# Patient Record
Sex: MALE | Race: WHITE | URBAN - METROPOLITAN AREA
[De-identification: names, ages, dates, MRNs, and addresses within clinical notes are randomized per-mention and may not be internally consistent; named-entity substitution may affect disease eponyms.]

---

## 2018-05-24 ENCOUNTER — HOSPITAL ENCOUNTER (OUTPATIENT)
Age: 78
Discharge: HOME | End: 2018-05-26
Payer: COMMERCIAL

## 2018-05-24 LAB
ALBUMIN SERPL-MCNC: 3.7 G/DL (ref 3.5–5.2)
ALP BLD-CCNC: 77 U/L (ref 40–129)
ALT SERPL-CCNC: 14 U/L (ref 0–40)
ANION GAP SERPL CALCULATED.3IONS-SCNC: 15 MMOL/L (ref 7–16)
AST SERPL-CCNC: 21 U/L (ref 0–39)
BILIRUB SERPL-MCNC: 0.4 MG/DL (ref 0–1.2)
BUN BLDV-MCNC: 19 MG/DL (ref 8–23)
CALCIUM SERPL-MCNC: 9.5 MG/DL (ref 8.6–10.2)
CHLORIDE BLD-SCNC: 99 MMOL/L (ref 98–107)
CO2: 25 MMOL/L (ref 22–29)
CREAT SERPL-MCNC: 0.8 MG/DL (ref 0.7–1.2)
GFR AFRICAN AMERICAN: >60
GFR NON-AFRICAN AMERICAN: >60 ML/MIN/1.73
GLUCOSE BLD-MCNC: 102 MG/DL (ref 74–109)
HCT VFR BLD CALC: 45.6 % (ref 37–54)
HEMOGLOBIN: 14.2 G/DL (ref 12.5–16.5)
MCH RBC QN AUTO: 29.8 PG (ref 26–35)
MCHC RBC AUTO-ENTMCNC: 31.1 % (ref 32–34.5)
MCV RBC AUTO: 95.8 FL (ref 80–99.9)
PDW BLD-RTO: 14.8 FL (ref 11.5–15)
PLATELET # BLD: 269 E9/L (ref 130–450)
PMV BLD AUTO: 9.3 FL (ref 7–12)
POTASSIUM SERPL-SCNC: 4.5 MMOL/L (ref 3.5–5)
PRO-BNP: 56 PG/ML (ref 0–450)
PROSTATE SPECIFIC ANTIGEN: 1.69 NG/ML (ref 0–4)
RBC # BLD: 4.76 E12/L (ref 3.8–5.8)
SODIUM BLD-SCNC: 139 MMOL/L (ref 132–146)
TOTAL PROTEIN: 7.1 G/DL (ref 6.4–8.3)
WBC # BLD: 6.3 E9/L (ref 4.5–11.5)

## 2018-05-24 PROCEDURE — 83880 ASSAY OF NATRIURETIC PEPTIDE: CPT

## 2018-05-24 PROCEDURE — 80053 COMPREHEN METABOLIC PANEL: CPT

## 2018-05-24 PROCEDURE — 83036 HEMOGLOBIN GLYCOSYLATED A1C: CPT

## 2018-05-24 PROCEDURE — G0103 PSA SCREENING: HCPCS

## 2018-05-24 PROCEDURE — 85027 COMPLETE CBC AUTOMATED: CPT

## 2018-05-25 LAB — HBA1C MFR BLD: 6 % (ref 4.8–5.9)

## 2019-02-28 ENCOUNTER — HOSPITAL ENCOUNTER (OUTPATIENT)
Age: 79
Discharge: HOME | End: 2019-03-02
Payer: COMMERCIAL

## 2019-02-28 LAB
ALBUMIN SERPL-MCNC: 3.7 G/DL (ref 3.5–5.2)
ALP BLD-CCNC: 90 U/L (ref 40–129)
ALT SERPL-CCNC: 13 U/L (ref 0–40)
ANION GAP SERPL CALCULATED.3IONS-SCNC: 11 MMOL/L (ref 7–16)
AST SERPL-CCNC: 18 U/L (ref 0–39)
BILIRUB SERPL-MCNC: 0.3 MG/DL (ref 0–1.2)
BUN BLDV-MCNC: 23 MG/DL (ref 8–23)
CALCIUM SERPL-MCNC: 9.6 MG/DL (ref 8.6–10.2)
CHLORIDE BLD-SCNC: 101 MMOL/L (ref 98–107)
CHOLESTEROL, TOTAL: 180 MG/DL (ref 0–199)
CO2: 29 MMOL/L (ref 22–29)
CREAT SERPL-MCNC: 0.9 MG/DL (ref 0.7–1.2)
GFR AFRICAN AMERICAN: >60
GFR NON-AFRICAN AMERICAN: >60 ML/MIN/1.73
GLUCOSE BLD-MCNC: 114 MG/DL (ref 74–99)
HCT VFR BLD CALC: 44.9 % (ref 37–54)
HDLC SERPL-MCNC: 53 MG/DL
HEMOGLOBIN: 14.1 G/DL (ref 12.5–16.5)
LDL CHOLESTEROL CALCULATED: 114 MG/DL (ref 0–99)
MCH RBC QN AUTO: 29.7 PG (ref 26–35)
MCHC RBC AUTO-ENTMCNC: 31.4 % (ref 32–34.5)
MCV RBC AUTO: 94.7 FL (ref 80–99.9)
PDW BLD-RTO: 14.7 FL (ref 11.5–15)
PLATELET # BLD: 298 E9/L (ref 130–450)
PMV BLD AUTO: 9.2 FL (ref 7–12)
POTASSIUM SERPL-SCNC: 4.9 MMOL/L (ref 3.5–5)
PROSTATE SPECIFIC ANTIGEN: 1.35 NG/ML (ref 0–4)
RBC # BLD: 4.74 E12/L (ref 3.8–5.8)
SODIUM BLD-SCNC: 141 MMOL/L (ref 132–146)
TOTAL PROTEIN: 7.3 G/DL (ref 6.4–8.3)
TRIGL SERPL-MCNC: 65 MG/DL (ref 0–149)
TSH SERPL DL<=0.05 MIU/L-ACNC: 2.53 UIU/ML (ref 0.27–4.2)
VITAMIN D 25-HYDROXY: 64 NG/ML (ref 30–100)
VLDLC SERPL CALC-MCNC: 13 MG/DL
WBC # BLD: 6.7 E9/L (ref 4.5–11.5)

## 2019-02-28 PROCEDURE — 80061 LIPID PANEL: CPT

## 2019-02-28 PROCEDURE — 85027 COMPLETE CBC AUTOMATED: CPT

## 2019-02-28 PROCEDURE — 80053 COMPREHEN METABOLIC PANEL: CPT

## 2019-02-28 PROCEDURE — 82306 VITAMIN D 25 HYDROXY: CPT

## 2019-02-28 PROCEDURE — G0103 PSA SCREENING: HCPCS

## 2019-02-28 PROCEDURE — 84443 ASSAY THYROID STIM HORMONE: CPT

## 2020-04-16 ENCOUNTER — HOSPITAL ENCOUNTER (OUTPATIENT)
Age: 80
Discharge: HOME | End: 2020-04-18
Payer: MEDICARE

## 2020-04-16 LAB
ALBUMIN SERPL-MCNC: 3.6 G/DL (ref 3.5–5.2)
ALP BLD-CCNC: 98 U/L (ref 40–129)
ALT SERPL-CCNC: 12 U/L (ref 0–40)
ANION GAP SERPL CALCULATED.3IONS-SCNC: 14 MMOL/L (ref 7–16)
AST SERPL-CCNC: 15 U/L (ref 0–39)
BILIRUB SERPL-MCNC: 0.3 MG/DL (ref 0–1.2)
BUN BLDV-MCNC: 17 MG/DL (ref 8–23)
C-REACTIVE PROTEIN: 1.5 MG/DL (ref 0–0.4)
CALCIUM SERPL-MCNC: 9.8 MG/DL (ref 8.6–10.2)
CHLORIDE BLD-SCNC: 101 MMOL/L (ref 98–107)
CO2: 26 MMOL/L (ref 22–29)
CREAT SERPL-MCNC: 0.9 MG/DL (ref 0.7–1.2)
GFR AFRICAN AMERICAN: >60
GFR NON-AFRICAN AMERICAN: >60 ML/MIN/1.73
GLUCOSE BLD-MCNC: 110 MG/DL (ref 74–99)
HCT VFR BLD CALC: 42 % (ref 37–54)
HEMOGLOBIN: 12.7 G/DL (ref 12.5–16.5)
MCH RBC QN AUTO: 26.7 PG (ref 26–35)
MCHC RBC AUTO-ENTMCNC: 30.2 % (ref 32–34.5)
MCV RBC AUTO: 88.4 FL (ref 80–99.9)
PDW BLD-RTO: 16 FL (ref 11.5–15)
PLATELET # BLD: 291 E9/L (ref 130–450)
PMV BLD AUTO: 9.2 FL (ref 7–12)
POTASSIUM SERPL-SCNC: 4.4 MMOL/L (ref 3.5–5)
PROSTATE SPECIFIC ANTIGEN: 2.68 NG/ML (ref 0–4)
RBC # BLD: 4.75 E12/L (ref 3.8–5.8)
SEDIMENTATION RATE, ERYTHROCYTE: 69 MM/HR (ref 0–15)
SODIUM BLD-SCNC: 141 MMOL/L (ref 132–146)
TOTAL PROTEIN: 7.4 G/DL (ref 6.4–8.3)
WBC # BLD: 6.3 E9/L (ref 4.5–11.5)

## 2020-04-16 PROCEDURE — 85027 COMPLETE CBC AUTOMATED: CPT

## 2020-04-16 PROCEDURE — 86140 C-REACTIVE PROTEIN: CPT

## 2020-04-16 PROCEDURE — G0103 PSA SCREENING: HCPCS

## 2020-04-16 PROCEDURE — 80053 COMPREHEN METABOLIC PANEL: CPT

## 2020-04-16 PROCEDURE — 85651 RBC SED RATE NONAUTOMATED: CPT

## 2023-01-01 ENCOUNTER — APPOINTMENT (OUTPATIENT)
Dept: GENERAL RADIOLOGY | Age: 83
DRG: 291 | End: 2023-01-01
Payer: MEDICARE

## 2023-01-01 ENCOUNTER — HOSPITAL ENCOUNTER (INPATIENT)
Age: 83
LOS: 9 days | DRG: 291 | End: 2023-07-19
Attending: EMERGENCY MEDICINE | Admitting: STUDENT IN AN ORGANIZED HEALTH CARE EDUCATION/TRAINING PROGRAM
Payer: MEDICARE

## 2023-01-01 ENCOUNTER — APPOINTMENT (OUTPATIENT)
Dept: CT IMAGING | Age: 83
DRG: 291 | End: 2023-01-01
Payer: MEDICARE

## 2023-01-01 VITALS
TEMPERATURE: 98.7 F | HEART RATE: 80 BPM | WEIGHT: 315 LBS | BODY MASS INDEX: 49.44 KG/M2 | RESPIRATION RATE: 18 BRPM | SYSTOLIC BLOOD PRESSURE: 117 MMHG | OXYGEN SATURATION: 94 % | DIASTOLIC BLOOD PRESSURE: 65 MMHG | HEIGHT: 67 IN

## 2023-01-01 DIAGNOSIS — L03.119 CELLULITIS OF LOWER EXTREMITY, UNSPECIFIED LATERALITY: ICD-10-CM

## 2023-01-01 DIAGNOSIS — R41.82 ALTERED MENTAL STATUS, UNSPECIFIED ALTERED MENTAL STATUS TYPE: Primary | ICD-10-CM

## 2023-01-01 DIAGNOSIS — I48.91 NEW ONSET ATRIAL FIBRILLATION (HCC): ICD-10-CM

## 2023-01-01 LAB
ALBUMIN SERPL-MCNC: 2.5 G/DL (ref 3.5–5.2)
ALBUMIN SERPL-MCNC: 2.7 G/DL (ref 3.5–5.2)
ALBUMIN SERPL-MCNC: 2.8 G/DL (ref 3.5–5.2)
ALBUMIN SERPL-MCNC: 3 G/DL (ref 3.5–5.2)
ALBUMIN SERPL-MCNC: 3.4 G/DL (ref 3.5–5.2)
ALP SERPL-CCNC: 74 U/L (ref 40–129)
ALP SERPL-CCNC: 76 U/L (ref 40–129)
ALP SERPL-CCNC: 78 U/L (ref 40–129)
ALP SERPL-CCNC: 85 U/L (ref 40–129)
ALP SERPL-CCNC: 99 U/L (ref 40–129)
ALT SERPL-CCNC: 10 U/L (ref 0–40)
ALT SERPL-CCNC: 5 U/L (ref 0–40)
ALT SERPL-CCNC: <5 U/L (ref 0–40)
ANION GAP SERPL CALCULATED.3IONS-SCNC: 3 MMOL/L (ref 7–16)
ANION GAP SERPL CALCULATED.3IONS-SCNC: 5 MMOL/L (ref 7–16)
ANION GAP SERPL CALCULATED.3IONS-SCNC: 6 MMOL/L (ref 7–16)
ANION GAP SERPL CALCULATED.3IONS-SCNC: 6 MMOL/L (ref 7–16)
ANION GAP SERPL CALCULATED.3IONS-SCNC: 7 MMOL/L (ref 7–16)
ANION GAP SERPL CALCULATED.3IONS-SCNC: 9 MMOL/L (ref 7–16)
ANISOCYTOSIS: ABNORMAL
AST SERPL-CCNC: 13 U/L (ref 0–39)
AST SERPL-CCNC: 14 U/L (ref 0–39)
AST SERPL-CCNC: 17 U/L (ref 0–39)
AST SERPL-CCNC: 17 U/L (ref 0–39)
AST SERPL-CCNC: 19 U/L (ref 0–39)
ATYPICAL LYMPHOCYTE ABSOLUTE COUNT: 0.1 K/UL (ref 0–0.46)
ATYPICAL LYMPHOCYTES: 2 % (ref 0–4)
B.E.: 11.7 MMOL/L (ref -3–3)
B.E.: 15.7 MMOL/L (ref -3–3)
B.E.: 16.6 MMOL/L (ref -3–3)
BACTERIA URNS QL MICRO: ABNORMAL /HPF
BASOPHILS # BLD: 0 K/UL (ref 0–0.2)
BASOPHILS # BLD: 0.01 K/UL (ref 0–0.2)
BASOPHILS # BLD: 0.01 K/UL (ref 0–0.2)
BASOPHILS # BLD: 0.02 K/UL (ref 0–0.2)
BASOPHILS # BLD: 0.03 E9/L (ref 0–0.2)
BASOPHILS NFR BLD: 0 % (ref 0–2)
BASOPHILS NFR BLD: 0.5 % (ref 0–2)
BILIRUB SERPL-MCNC: 0.5 MG/DL (ref 0–1.2)
BILIRUB SERPL-MCNC: 0.6 MG/DL (ref 0–1.2)
BILIRUB SERPL-MCNC: 0.7 MG/DL (ref 0–1.2)
BILIRUB UR QL STRIP: NEGATIVE
BNP BLD-MCNC: 1025 PG/ML (ref 0–450)
BNP BLD-MCNC: 2027 PG/ML (ref 0–450)
BUN SERPL-MCNC: 18 MG/DL (ref 6–23)
BUN SERPL-MCNC: 21 MG/DL (ref 6–23)
BUN SERPL-MCNC: 21 MG/DL (ref 6–23)
BUN SERPL-MCNC: 22 MG/DL (ref 6–23)
BUN SERPL-MCNC: 22 MG/DL (ref 6–23)
BUN SERPL-MCNC: 23 MG/DL (ref 6–23)
BUN SERPL-MCNC: 23 MG/DL (ref 6–23)
BUN SERPL-MCNC: 24 MG/DL (ref 6–23)
BUN SERPL-MCNC: 27 MG/DL (ref 6–23)
CALCIUM SERPL-MCNC: 8.7 MG/DL (ref 8.6–10.2)
CALCIUM SERPL-MCNC: 8.9 MG/DL (ref 8.6–10.2)
CALCIUM SERPL-MCNC: 9 MG/DL (ref 8.6–10.2)
CALCIUM SERPL-MCNC: 9.1 MG/DL (ref 8.6–10.2)
CALCIUM SERPL-MCNC: 9.1 MG/DL (ref 8.6–10.2)
CALCIUM SERPL-MCNC: 9.2 MG/DL (ref 8.6–10.2)
CALCIUM SERPL-MCNC: 9.3 MG/DL (ref 8.6–10.2)
CHLORIDE SERPL-SCNC: 91 MMOL/L (ref 98–107)
CHLORIDE SERPL-SCNC: 94 MMOL/L (ref 98–107)
CHLORIDE SERPL-SCNC: 95 MMOL/L (ref 98–107)
CHLORIDE SERPL-SCNC: 96 MMOL/L (ref 98–107)
CHLORIDE SERPL-SCNC: 97 MMOL/L (ref 98–107)
CHLORIDE SERPL-SCNC: 98 MMOL/L (ref 98–107)
CHLORIDE SERPL-SCNC: 99 MMOL/L (ref 98–107)
CHP ED QC CHECK: NORMAL
CLARITY UR: CLEAR
CO2 SERPL-SCNC: 32 MMOL/L (ref 22–29)
CO2 SERPL-SCNC: 33 MMOL/L (ref 22–29)
CO2 SERPL-SCNC: 35 MMOL/L (ref 22–29)
CO2 SERPL-SCNC: 38 MMOL/L (ref 22–29)
CO2 SERPL-SCNC: 39 MMOL/L (ref 22–29)
CO2 SERPL-SCNC: 39 MMOL/L (ref 22–29)
CO2 SERPL-SCNC: 40 MMOL/L (ref 22–29)
CO2 SERPL-SCNC: 44 MMOL/L (ref 22–29)
CO2 SERPL-SCNC: 45 MMOL/L (ref 22–29)
COHB: 1.9 % (ref 0–1.5)
COHB: 2.1 % (ref 0–1.5)
COHB: 2.2 % (ref 0–1.5)
COLOR UR: YELLOW
COMMENT: ABNORMAL
COMMENT: ABNORMAL
CREAT SERPL-MCNC: 0.7 MG/DL (ref 0.7–1.2)
CREAT SERPL-MCNC: 0.7 MG/DL (ref 0.7–1.2)
CREAT SERPL-MCNC: 0.8 MG/DL (ref 0.7–1.2)
CREAT SERPL-MCNC: 0.9 MG/DL (ref 0.7–1.2)
CREAT SERPL-MCNC: 0.9 MG/DL (ref 0.7–1.2)
CRITICAL: ABNORMAL
DATE ANALYZED: ABNORMAL
DATE OF COLLECTION: ABNORMAL
EKG ATRIAL RATE: 88 BPM
EKG Q-T INTERVAL: 414 MS
EKG QRS DURATION: 142 MS
EKG QTC CALCULATION (BAZETT): 468 MS
EKG R AXIS: 121 DEGREES
EKG T AXIS: -12 DEGREES
EKG VENTRICULAR RATE: 77 BPM
EOSINOPHIL # BLD: 0.06 K/UL (ref 0.05–0.5)
EOSINOPHIL # BLD: 0.09 E9/L (ref 0.05–0.5)
EOSINOPHIL # BLD: 0.1 K/UL (ref 0.05–0.5)
EOSINOPHIL # BLD: 0.11 K/UL (ref 0.05–0.5)
EOSINOPHIL # BLD: 0.2 K/UL (ref 0.05–0.5)
EOSINOPHIL NFR BLD: 1.6 % (ref 0–6)
EOSINOPHILS RELATIVE PERCENT: 1 % (ref 0–6)
EOSINOPHILS RELATIVE PERCENT: 2 % (ref 0–6)
EOSINOPHILS RELATIVE PERCENT: 3 % (ref 0–6)
EOSINOPHILS RELATIVE PERCENT: 4 % (ref 0–6)
ERYTHROCYTE [DISTWIDTH] IN BLOOD BY AUTOMATED COUNT: 18.2 FL (ref 11.5–15)
ERYTHROCYTE [DISTWIDTH] IN BLOOD BY AUTOMATED COUNT: 18.2 FL (ref 11.5–15)
ERYTHROCYTE [DISTWIDTH] IN BLOOD BY AUTOMATED COUNT: 18.4 FL (ref 11.5–15)
ERYTHROCYTE [DISTWIDTH] IN BLOOD BY AUTOMATED COUNT: 18.4 FL (ref 11.5–15)
ERYTHROCYTE [DISTWIDTH] IN BLOOD BY AUTOMATED COUNT: 18.5 % (ref 11.5–15)
ERYTHROCYTE [DISTWIDTH] IN BLOOD BY AUTOMATED COUNT: 18.5 FL (ref 11.5–15)
ERYTHROCYTE [DISTWIDTH] IN BLOOD BY AUTOMATED COUNT: 18.6 % (ref 11.5–15)
ERYTHROCYTE [DISTWIDTH] IN BLOOD BY AUTOMATED COUNT: 18.8 % (ref 11.5–15)
ERYTHROCYTE [DISTWIDTH] IN BLOOD BY AUTOMATED COUNT: 19.1 % (ref 11.5–15)
FIO2: 40 %
FIO2: 40 %
GFR SERPL CREATININE-BSD FRML MDRD: >60 ML/MIN/1.73M2
GLUCOSE BLD-MCNC: 128 MG/DL
GLUCOSE SERPL-MCNC: 107 MG/DL (ref 74–99)
GLUCOSE SERPL-MCNC: 111 MG/DL (ref 74–99)
GLUCOSE SERPL-MCNC: 111 MG/DL (ref 74–99)
GLUCOSE SERPL-MCNC: 120 MG/DL (ref 74–99)
GLUCOSE SERPL-MCNC: 122 MG/DL (ref 74–99)
GLUCOSE SERPL-MCNC: 122 MG/DL (ref 74–99)
GLUCOSE SERPL-MCNC: 128 MG/DL (ref 74–99)
GLUCOSE SERPL-MCNC: 165 MG/DL (ref 74–99)
GLUCOSE SERPL-MCNC: 167 MG/DL (ref 74–99)
GLUCOSE UR STRIP-MCNC: NEGATIVE MG/DL
HBA1C MFR BLD: 6.1 % (ref 4–5.6)
HCO3: 42.8 MMOL/L (ref 22–26)
HCO3: 45.1 MMOL/L (ref 22–26)
HCO3: 45.9 MMOL/L (ref 22–26)
HCT VFR BLD AUTO: 34.9 % (ref 37–54)
HCT VFR BLD AUTO: 36.3 % (ref 37–54)
HCT VFR BLD AUTO: 37.8 % (ref 37–54)
HCT VFR BLD AUTO: 38.4 % (ref 37–54)
HCT VFR BLD AUTO: 39.3 % (ref 37–54)
HCT VFR BLD AUTO: 39.5 % (ref 37–54)
HCT VFR BLD AUTO: 40.3 % (ref 37–54)
HCT VFR BLD AUTO: 41.1 % (ref 37–54)
HCT VFR BLD AUTO: 41.1 % (ref 37–54)
HGB BLD-MCNC: 10.1 G/DL (ref 12.5–16.5)
HGB BLD-MCNC: 10.5 G/DL (ref 12.5–16.5)
HGB BLD-MCNC: 10.5 G/DL (ref 12.5–16.5)
HGB BLD-MCNC: 10.8 G/DL (ref 12.5–16.5)
HGB BLD-MCNC: 10.8 G/DL (ref 12.5–16.5)
HGB BLD-MCNC: 10.9 G/DL (ref 12.5–16.5)
HGB BLD-MCNC: 11.1 G/DL (ref 12.5–16.5)
HGB BLD-MCNC: 11.2 G/DL (ref 12.5–16.5)
HGB BLD-MCNC: 9.9 G/DL (ref 12.5–16.5)
HGB UR QL STRIP: NEGATIVE
HHB: 2.2 % (ref 0–5)
HHB: 4.1 % (ref 0–5)
HHB: 5.8 % (ref 0–5)
HYALINE CASTS #/AREA URNS LPF: ABNORMAL /LPF (ref 0–2)
HYPOCHROMIA: ABNORMAL
IMM GRANULOCYTES # BLD AUTO: 0.03 K/UL (ref 0–0.58)
IMM GRANULOCYTES # BLD AUTO: <0.03 K/UL (ref 0–0.58)
IMM GRANULOCYTES # BLD: 0.02 E9/L
IMM GRANULOCYTES NFR BLD: 0 % (ref 0–5)
IMM GRANULOCYTES NFR BLD: 0.4 % (ref 0–5)
IMM GRANULOCYTES NFR BLD: 1 % (ref 0–5)
KETONES UR STRIP-MCNC: NEGATIVE MG/DL
LAB: ABNORMAL
LACTATE BLDV-SCNC: 1.2 MMOL/L (ref 0.5–2.2)
LEUKOCYTE ESTERASE UR QL STRIP: NEGATIVE
LIPASE: 10 U/L (ref 13–60)
LV EF: 55 %
LVEF MODALITY: NORMAL
LYMPHOCYTES # BLD: 0.71 E9/L (ref 1.5–4)
LYMPHOCYTES # BLD: 12 % (ref 20–42)
LYMPHOCYTES # BLD: 13 % (ref 20–42)
LYMPHOCYTES # BLD: 14 % (ref 20–42)
LYMPHOCYTES # BLD: 17 % (ref 20–42)
LYMPHOCYTES NFR BLD: 0.66 K/UL (ref 1.5–4)
LYMPHOCYTES NFR BLD: 0.66 K/UL (ref 1.5–4)
LYMPHOCYTES NFR BLD: 0.7 K/UL (ref 1.5–4)
LYMPHOCYTES NFR BLD: 0.79 K/UL (ref 1.5–4)
LYMPHOCYTES NFR BLD: 12.9 % (ref 20–42)
Lab: ABNORMAL
MCH RBC QN AUTO: 23.3 PG (ref 26–35)
MCH RBC QN AUTO: 23.4 PG (ref 26–35)
MCH RBC QN AUTO: 23.5 PG (ref 26–35)
MCH RBC QN AUTO: 23.7 PG (ref 26–35)
MCH RBC QN AUTO: 23.7 PG (ref 26–35)
MCH RBC QN AUTO: 23.8 PG (ref 26–35)
MCH RBC QN AUTO: 23.9 PG (ref 26–35)
MCHC RBC AUTO-ENTMCNC: 26.6 G/DL (ref 32–34.5)
MCHC RBC AUTO-ENTMCNC: 27 G/DL (ref 32–34.5)
MCHC RBC AUTO-ENTMCNC: 27 G/DL (ref 32–34.5)
MCHC RBC AUTO-ENTMCNC: 27.3 % (ref 32–34.5)
MCHC RBC AUTO-ENTMCNC: 27.3 G/DL (ref 32–34.5)
MCHC RBC AUTO-ENTMCNC: 27.5 % (ref 32–34.5)
MCHC RBC AUTO-ENTMCNC: 27.8 % (ref 32–34.5)
MCHC RBC AUTO-ENTMCNC: 28.4 % (ref 32–34.5)
MCHC RBC AUTO-ENTMCNC: 28.6 % (ref 32–34.5)
MCV RBC AUTO: 82.9 FL (ref 80–99.9)
MCV RBC AUTO: 83.5 FL (ref 80–99.9)
MCV RBC AUTO: 85.4 FL (ref 80–99.9)
MCV RBC AUTO: 85.7 FL (ref 80–99.9)
MCV RBC AUTO: 85.8 FL (ref 80–99.9)
MCV RBC AUTO: 86.5 FL (ref 80–99.9)
MCV RBC AUTO: 86.7 FL (ref 80–99.9)
MCV RBC AUTO: 87.3 FL (ref 80–99.9)
MCV RBC AUTO: 87.6 FL (ref 80–99.9)
METER GLUCOSE: 136 MG/DL (ref 74–99)
METHB: 0.3 % (ref 0–1.5)
MODE: ABNORMAL
MONOCYTES # BLD: 0.43 E9/L (ref 0.1–0.95)
MONOCYTES NFR BLD: 0.25 K/UL (ref 0.1–0.95)
MONOCYTES NFR BLD: 0.39 K/UL (ref 0.1–0.95)
MONOCYTES NFR BLD: 0.4 K/UL (ref 0.1–0.95)
MONOCYTES NFR BLD: 0.41 K/UL (ref 0.1–0.95)
MONOCYTES NFR BLD: 10 % (ref 2–12)
MONOCYTES NFR BLD: 4 % (ref 2–12)
MONOCYTES NFR BLD: 7 % (ref 2–12)
MONOCYTES NFR BLD: 7.8 % (ref 2–12)
MONOCYTES NFR BLD: 8 % (ref 2–12)
NEUTROPHILS # BLD: 4.24 E9/L (ref 1.8–7.3)
NEUTROPHILS NFR BLD: 70 % (ref 43–80)
NEUTROPHILS NFR BLD: 76 % (ref 43–80)
NEUTROPHILS NFR BLD: 77 % (ref 43–80)
NEUTROPHILS NFR BLD: 79 % (ref 43–80)
NEUTS SEG NFR BLD: 2.77 K/UL (ref 1.8–7.3)
NEUTS SEG NFR BLD: 4.02 K/UL (ref 1.8–7.3)
NEUTS SEG NFR BLD: 4.27 K/UL (ref 1.8–7.3)
NEUTS SEG NFR BLD: 4.43 K/UL (ref 1.8–7.3)
NEUTS SEG NFR BLD: 76.8 % (ref 43–80)
NITRITE UR QL STRIP: NEGATIVE
O2 CONTENT: 16.1 ML/DL
O2 CONTENT: 16.2 ML/DL
O2 CONTENT: 16.6 ML/DL
O2 SATURATION: 94.1 % (ref 92–98.5)
O2 SATURATION: 95.8 % (ref 92–98.5)
O2 SATURATION: 97.7 % (ref 92–98.5)
O2HB: 92 % (ref 94–97)
O2HB: 93.4 % (ref 94–97)
O2HB: 95.4 % (ref 94–97)
OPERATOR ID: 1394
OPERATOR ID: ABNORMAL
OPERATOR ID: ABNORMAL
PATIENT TEMP: 37 C
PCO2: 76.8 MMHG (ref 35–45)
PCO2: 91.8 MMHG (ref 35–45)
PCO2: 99.4 MMHG (ref 35–45)
PEEP/CPAP: 6 CMH2O
PFO2: 2.01 MMHG/%
PFO2: 2.69 MMHG/%
PH BLOOD GAS: 7.25 (ref 7.35–7.45)
PH BLOOD GAS: 7.32 (ref 7.35–7.45)
PH BLOOD GAS: 7.39 (ref 7.35–7.45)
PH UR STRIP: 5.5 [PH] (ref 5–9)
PLATELET # BLD AUTO: 254 K/UL (ref 130–450)
PLATELET # BLD AUTO: 255 E9/L (ref 130–450)
PLATELET # BLD AUTO: 255 K/UL (ref 130–450)
PLATELET # BLD AUTO: 269 K/UL (ref 130–450)
PLATELET # BLD AUTO: 271 E9/L (ref 130–450)
PLATELET # BLD AUTO: 278 E9/L (ref 130–450)
PLATELET # BLD AUTO: 289 K/UL (ref 130–450)
PLATELET # BLD AUTO: 300 E9/L (ref 130–450)
PLATELET # BLD AUTO: 305 E9/L (ref 130–450)
PMV BLD AUTO: 8.6 FL (ref 7–12)
PMV BLD AUTO: 8.6 FL (ref 7–12)
PMV BLD AUTO: 8.7 FL (ref 7–12)
PMV BLD AUTO: 8.8 FL (ref 7–12)
PMV BLD AUTO: 8.9 FL (ref 7–12)
PMV BLD AUTO: 9 FL (ref 7–12)
PMV BLD AUTO: 9.1 FL (ref 7–12)
PMV BLD AUTO: 9.5 FL (ref 7–12)
PMV BLD AUTO: 9.6 FL (ref 7–12)
PO2: 107.7 MMHG (ref 75–100)
PO2: 80.3 MMHG (ref 75–100)
PO2: 82 MMHG (ref 75–100)
POLYCHROMASIA: ABNORMAL
POTASSIUM SERPL-SCNC: 3.9 MMOL/L (ref 3.5–5)
POTASSIUM SERPL-SCNC: 4 MMOL/L (ref 3.5–5)
POTASSIUM SERPL-SCNC: 4.3 MMOL/L (ref 3.5–5)
POTASSIUM SERPL-SCNC: 4.4 MMOL/L (ref 3.5–5)
POTASSIUM SERPL-SCNC: 4.4 MMOL/L (ref 3.5–5)
POTASSIUM SERPL-SCNC: 4.5 MMOL/L (ref 3.5–5)
POTASSIUM SERPL-SCNC: 4.6 MMOL/L (ref 3.5–5)
POTASSIUM SERPL-SCNC: 4.9 MMOL/L (ref 3.5–5)
POTASSIUM SERPL-SCNC: 5.2 MMOL/L (ref 3.5–5)
POTASSIUM SERPL-SCNC: 5.4 MMOL/L (ref 3.5–5)
POTASSIUM SERPL-SCNC: 5.4 MMOL/L (ref 3.5–5)
PROCALCITONIN: 0.04 NG/ML (ref 0–0.08)
PROCALCITONIN: 0.04 NG/ML (ref 0–0.08)
PROT SERPL-MCNC: 6.1 G/DL (ref 6.4–8.3)
PROT SERPL-MCNC: 6.2 G/DL (ref 6.4–8.3)
PROT SERPL-MCNC: 6.5 G/DL (ref 6.4–8.3)
PROT SERPL-MCNC: 6.5 G/DL (ref 6.4–8.3)
PROT SERPL-MCNC: 6.9 G/DL (ref 6.4–8.3)
PROT UR STRIP-MCNC: NEGATIVE MG/DL
RBC # BLD AUTO: 4.18 E12/L (ref 3.8–5.8)
RBC # BLD AUTO: 4.23 E12/L (ref 3.8–5.8)
RBC # BLD AUTO: 4.48 M/UL (ref 3.8–5.8)
RBC # BLD AUTO: 4.51 M/UL (ref 3.8–5.8)
RBC # BLD AUTO: 4.56 E12/L (ref 3.8–5.8)
RBC # BLD AUTO: 4.6 E12/L (ref 3.8–5.8)
RBC # BLD AUTO: 4.65 M/UL (ref 3.8–5.8)
RBC # BLD AUTO: 4.71 E12/L (ref 3.8–5.8)
RBC # BLD AUTO: 4.75 M/UL (ref 3.8–5.8)
RBC # BLD: ABNORMAL 10*6/UL
RBC #/AREA URNS HPF: ABNORMAL /HPF (ref 0–2)
RI(T): 0.67
RI(T): 1.45
RR MECHANICAL: 14 B/MIN
RR MECHANICAL: 14 B/MIN
SODIUM SERPL-SCNC: 138 MMOL/L (ref 132–146)
SODIUM SERPL-SCNC: 138 MMOL/L (ref 132–146)
SODIUM SERPL-SCNC: 139 MMOL/L (ref 132–146)
SODIUM SERPL-SCNC: 140 MMOL/L (ref 132–146)
SODIUM SERPL-SCNC: 141 MMOL/L (ref 132–146)
SODIUM SERPL-SCNC: 142 MMOL/L (ref 132–146)
SODIUM SERPL-SCNC: 144 MMOL/L (ref 132–146)
SOURCE, BLOOD GAS: ABNORMAL
SP GR UR STRIP: 1.02 (ref 1–1.03)
THB: 12.2 G/DL (ref 11.5–16.5)
THB: 12.3 G/DL (ref 11.5–16.5)
THB: 12.5 G/DL (ref 11.5–16.5)
TIME ANALYZED: 1205
TIME ANALYZED: 156
TIME ANALYZED: 2234
TROPONIN, HIGH SENSITIVITY: 29 NG/L (ref 0–11)
TROPONIN, HIGH SENSITIVITY: 32 NG/L (ref 0–11)
TSH SERPL-MCNC: 1.34 UIU/ML (ref 0.27–4.2)
UROBILINOGEN UR STRIP-ACNC: 0.2 E.U./DL
VT MECHANICAL: 500 ML
VT MECHANICAL: 500 ML
WBC # BLD: 5.5 E9/L (ref 4.5–11.5)
WBC # BLD: 6.6 E9/L (ref 4.5–11.5)
WBC # BLD: 6.8 E9/L (ref 4.5–11.5)
WBC # BLD: 6.9 E9/L (ref 4.5–11.5)
WBC # BLD: 7.3 E9/L (ref 4.5–11.5)
WBC #/AREA URNS HPF: ABNORMAL /HPF (ref 0–5)
WBC OTHER # BLD: 4 K/UL (ref 4.5–11.5)
WBC OTHER # BLD: 5.2 K/UL (ref 4.5–11.5)
WBC OTHER # BLD: 5.6 K/UL (ref 4.5–11.5)
WBC OTHER # BLD: 5.6 K/UL (ref 4.5–11.5)

## 2023-01-01 PROCEDURE — 6370000000 HC RX 637 (ALT 250 FOR IP): Performed by: INTERNAL MEDICINE

## 2023-01-01 PROCEDURE — 85027 COMPLETE CBC AUTOMATED: CPT

## 2023-01-01 PROCEDURE — 6370000000 HC RX 637 (ALT 250 FOR IP): Performed by: NURSE PRACTITIONER

## 2023-01-01 PROCEDURE — 84484 ASSAY OF TROPONIN QUANT: CPT

## 2023-01-01 PROCEDURE — APPSS60 APP SPLIT SHARED TIME 46-60 MINUTES: Performed by: NURSE PRACTITIONER

## 2023-01-01 PROCEDURE — 71045 X-RAY EXAM CHEST 1 VIEW: CPT

## 2023-01-01 PROCEDURE — 99232 SBSQ HOSP IP/OBS MODERATE 35: CPT | Performed by: INTERNAL MEDICINE

## 2023-01-01 PROCEDURE — 94660 CPAP INITIATION&MGMT: CPT

## 2023-01-01 PROCEDURE — 6360000002 HC RX W HCPCS: Performed by: INTERNAL MEDICINE

## 2023-01-01 PROCEDURE — 36415 COLL VENOUS BLD VENIPUNCTURE: CPT

## 2023-01-01 PROCEDURE — 94640 AIRWAY INHALATION TREATMENT: CPT

## 2023-01-01 PROCEDURE — 2580000003 HC RX 258: Performed by: STUDENT IN AN ORGANIZED HEALTH CARE EDUCATION/TRAINING PROGRAM

## 2023-01-01 PROCEDURE — 2700000000 HC OXYGEN THERAPY PER DAY

## 2023-01-01 PROCEDURE — 83880 ASSAY OF NATRIURETIC PEPTIDE: CPT

## 2023-01-01 PROCEDURE — 93005 ELECTROCARDIOGRAM TRACING: CPT

## 2023-01-01 PROCEDURE — 84145 PROCALCITONIN (PCT): CPT

## 2023-01-01 PROCEDURE — 99233 SBSQ HOSP IP/OBS HIGH 50: CPT | Performed by: INTERNAL MEDICINE

## 2023-01-01 PROCEDURE — 2060000000 HC ICU INTERMEDIATE R&B

## 2023-01-01 PROCEDURE — 99222 1ST HOSP IP/OBS MODERATE 55: CPT | Performed by: STUDENT IN AN ORGANIZED HEALTH CARE EDUCATION/TRAINING PROGRAM

## 2023-01-01 PROCEDURE — 92610 EVALUATE SWALLOWING FUNCTION: CPT | Performed by: SPEECH-LANGUAGE PATHOLOGIST

## 2023-01-01 PROCEDURE — 92526 ORAL FUNCTION THERAPY: CPT | Performed by: SPEECH-LANGUAGE PATHOLOGIST

## 2023-01-01 PROCEDURE — 80053 COMPREHEN METABOLIC PANEL: CPT

## 2023-01-01 PROCEDURE — 74177 CT ABD & PELVIS W/CONTRAST: CPT

## 2023-01-01 PROCEDURE — C9113 INJ PANTOPRAZOLE SODIUM, VIA: HCPCS | Performed by: INTERNAL MEDICINE

## 2023-01-01 PROCEDURE — 84132 ASSAY OF SERUM POTASSIUM: CPT

## 2023-01-01 PROCEDURE — 80048 BASIC METABOLIC PNL TOTAL CA: CPT

## 2023-01-01 PROCEDURE — 2500000003 HC RX 250 WO HCPCS: Performed by: STUDENT IN AN ORGANIZED HEALTH CARE EDUCATION/TRAINING PROGRAM

## 2023-01-01 PROCEDURE — 83605 ASSAY OF LACTIC ACID: CPT

## 2023-01-01 PROCEDURE — 83690 ASSAY OF LIPASE: CPT

## 2023-01-01 PROCEDURE — 85025 COMPLETE CBC W/AUTO DIFF WBC: CPT

## 2023-01-01 PROCEDURE — 6370000000 HC RX 637 (ALT 250 FOR IP): Performed by: STUDENT IN AN ORGANIZED HEALTH CARE EDUCATION/TRAINING PROGRAM

## 2023-01-01 PROCEDURE — 5A09557 ASSISTANCE WITH RESPIRATORY VENTILATION, GREATER THAN 96 CONSECUTIVE HOURS, CONTINUOUS POSITIVE AIRWAY PRESSURE: ICD-10-PCS | Performed by: INTERNAL MEDICINE

## 2023-01-01 PROCEDURE — 6360000002 HC RX W HCPCS: Performed by: NURSE PRACTITIONER

## 2023-01-01 PROCEDURE — 6360000002 HC RX W HCPCS

## 2023-01-01 PROCEDURE — 97161 PT EVAL LOW COMPLEX 20 MIN: CPT

## 2023-01-01 PROCEDURE — 99223 1ST HOSP IP/OBS HIGH 75: CPT | Performed by: NURSE PRACTITIONER

## 2023-01-01 PROCEDURE — 97530 THERAPEUTIC ACTIVITIES: CPT

## 2023-01-01 PROCEDURE — 6360000002 HC RX W HCPCS: Performed by: STUDENT IN AN ORGANIZED HEALTH CARE EDUCATION/TRAINING PROGRAM

## 2023-01-01 PROCEDURE — 6370000000 HC RX 637 (ALT 250 FOR IP)

## 2023-01-01 PROCEDURE — 99239 HOSP IP/OBS DSCHRG MGMT >30: CPT | Performed by: NURSE PRACTITIONER

## 2023-01-01 PROCEDURE — 93010 ELECTROCARDIOGRAM REPORT: CPT | Performed by: INTERNAL MEDICINE

## 2023-01-01 PROCEDURE — 70450 CT HEAD/BRAIN W/O DYE: CPT

## 2023-01-01 PROCEDURE — 99232 SBSQ HOSP IP/OBS MODERATE 35: CPT | Performed by: NURSE PRACTITIONER

## 2023-01-01 PROCEDURE — 2580000003 HC RX 258

## 2023-01-01 PROCEDURE — 99223 1ST HOSP IP/OBS HIGH 75: CPT | Performed by: INTERNAL MEDICINE

## 2023-01-01 PROCEDURE — 81001 URINALYSIS AUTO W/SCOPE: CPT

## 2023-01-01 PROCEDURE — 83036 HEMOGLOBIN GLYCOSYLATED A1C: CPT

## 2023-01-01 PROCEDURE — 82805 BLOOD GASES W/O2 SATURATION: CPT

## 2023-01-01 PROCEDURE — APPSS30 APP SPLIT SHARED TIME 16-30 MINUTES: Performed by: NURSE PRACTITIONER

## 2023-01-01 PROCEDURE — 93306 TTE W/DOPPLER COMPLETE: CPT

## 2023-01-01 PROCEDURE — 99285 EMERGENCY DEPT VISIT HI MDM: CPT

## 2023-01-01 PROCEDURE — 6370000000 HC RX 637 (ALT 250 FOR IP): Performed by: FAMILY MEDICINE

## 2023-01-01 PROCEDURE — 82962 GLUCOSE BLOOD TEST: CPT

## 2023-01-01 PROCEDURE — 84443 ASSAY THYROID STIM HORMONE: CPT

## 2023-01-01 PROCEDURE — 99231 SBSQ HOSP IP/OBS SF/LOW 25: CPT | Performed by: NURSE PRACTITIONER

## 2023-01-01 PROCEDURE — 97165 OT EVAL LOW COMPLEX 30 MIN: CPT

## 2023-01-01 PROCEDURE — 6360000004 HC RX CONTRAST MEDICATION: Performed by: RADIOLOGY

## 2023-01-01 RX ORDER — BUDESONIDE 0.5 MG/2ML
0.5 INHALANT ORAL
Status: DISCONTINUED | OUTPATIENT
Start: 2023-01-01 | End: 2023-01-01 | Stop reason: HOSPADM

## 2023-01-01 RX ORDER — VIT A/VIT C/VIT E/ZINC/COPPER 2148-113
TABLET ORAL
COMMUNITY

## 2023-01-01 RX ORDER — FUROSEMIDE 40 MG/1
20 TABLET ORAL DAILY
Status: CANCELLED | OUTPATIENT
Start: 2023-01-01

## 2023-01-01 RX ORDER — IPRATROPIUM BROMIDE AND ALBUTEROL SULFATE 2.5; .5 MG/3ML; MG/3ML
3 SOLUTION RESPIRATORY (INHALATION)
Status: DISCONTINUED | OUTPATIENT
Start: 2023-01-01 | End: 2023-01-01

## 2023-01-01 RX ORDER — ALBUTEROL SULFATE 2.5 MG/3ML
2.5 SOLUTION RESPIRATORY (INHALATION) EVERY 6 HOURS PRN
Status: DISCONTINUED | OUTPATIENT
Start: 2023-01-01 | End: 2023-01-01 | Stop reason: HOSPADM

## 2023-01-01 RX ORDER — MORPHINE SULFATE 2 MG/ML
2 INJECTION, SOLUTION INTRAMUSCULAR; INTRAVENOUS EVERY 4 HOURS PRN
Status: DISCONTINUED | OUTPATIENT
Start: 2023-01-01 | End: 2023-01-01

## 2023-01-01 RX ORDER — POTASSIUM CHLORIDE 20 MEQ/1
20 TABLET, EXTENDED RELEASE ORAL DAILY
COMMUNITY

## 2023-01-01 RX ORDER — MORPHINE SULFATE 4 MG/ML
4 INJECTION, SOLUTION INTRAMUSCULAR; INTRAVENOUS EVERY 4 HOURS PRN
Status: DISCONTINUED | OUTPATIENT
Start: 2023-01-01 | End: 2023-01-01 | Stop reason: HOSPADM

## 2023-01-01 RX ORDER — ONDANSETRON 2 MG/ML
4 INJECTION INTRAMUSCULAR; INTRAVENOUS EVERY 6 HOURS PRN
Status: DISCONTINUED | OUTPATIENT
Start: 2023-01-01 | End: 2023-01-01 | Stop reason: HOSPADM

## 2023-01-01 RX ORDER — LACTULOSE 10 G/15ML
20 SOLUTION ORAL ONCE
Status: COMPLETED | OUTPATIENT
Start: 2023-01-01 | End: 2023-01-01

## 2023-01-01 RX ORDER — NYSTATIN 100000 U/G
CREAM TOPICAL 2 TIMES DAILY
COMMUNITY

## 2023-01-01 RX ORDER — MELOXICAM 7.5 MG/1
7.5 TABLET ORAL DAILY
Status: DISCONTINUED | OUTPATIENT
Start: 2023-01-01 | End: 2023-01-01

## 2023-01-01 RX ORDER — ASCORBIC ACID 500 MG
500 TABLET ORAL DAILY
Status: DISCONTINUED | OUTPATIENT
Start: 2023-01-01 | End: 2023-01-01 | Stop reason: HOSPADM

## 2023-01-01 RX ORDER — TROSPIUM CHLORIDE 20 MG/1
20 TABLET, FILM COATED ORAL
Status: DISCONTINUED | OUTPATIENT
Start: 2023-01-01 | End: 2023-01-01 | Stop reason: HOSPADM

## 2023-01-01 RX ORDER — BUDESONIDE AND FORMOTEROL FUMARATE DIHYDRATE 160; 4.5 UG/1; UG/1
2 AEROSOL RESPIRATORY (INHALATION) 2 TIMES DAILY
COMMUNITY

## 2023-01-01 RX ORDER — FUROSEMIDE 10 MG/ML
40 INJECTION INTRAMUSCULAR; INTRAVENOUS 2 TIMES DAILY
Status: DISCONTINUED | OUTPATIENT
Start: 2023-01-01 | End: 2023-01-01 | Stop reason: HOSPADM

## 2023-01-01 RX ORDER — CEPHALEXIN 250 MG/1
250 CAPSULE ORAL EVERY 6 HOURS SCHEDULED
Status: DISPENSED | OUTPATIENT
Start: 2023-01-01 | End: 2023-01-01

## 2023-01-01 RX ORDER — FOLIC ACID 1 MG/1
40 TABLET ORAL DAILY
COMMUNITY

## 2023-01-01 RX ORDER — ARFORMOTEROL TARTRATE 15 UG/2ML
15 SOLUTION RESPIRATORY (INHALATION)
Status: DISCONTINUED | OUTPATIENT
Start: 2023-01-01 | End: 2023-01-01 | Stop reason: HOSPADM

## 2023-01-01 RX ORDER — BISACODYL 5 MG/1
5 TABLET, DELAYED RELEASE ORAL DAILY PRN
Status: DISCONTINUED | OUTPATIENT
Start: 2023-01-01 | End: 2023-01-01 | Stop reason: HOSPADM

## 2023-01-01 RX ORDER — ALBUTEROL SULFATE 90 UG/1
2 AEROSOL, METERED RESPIRATORY (INHALATION) EVERY 6 HOURS PRN
Status: DISCONTINUED | OUTPATIENT
Start: 2023-01-01 | End: 2023-01-01 | Stop reason: CLARIF

## 2023-01-01 RX ORDER — POLYETHYLENE GLYCOL 3350 17 G/17G
17 POWDER, FOR SOLUTION ORAL DAILY PRN
Status: DISCONTINUED | OUTPATIENT
Start: 2023-01-01 | End: 2023-01-01 | Stop reason: HOSPADM

## 2023-01-01 RX ORDER — SODIUM CHLORIDE 0.9 % (FLUSH) 0.9 %
5-40 SYRINGE (ML) INJECTION PRN
Status: DISCONTINUED | OUTPATIENT
Start: 2023-01-01 | End: 2023-01-01 | Stop reason: HOSPADM

## 2023-01-01 RX ORDER — FUROSEMIDE 10 MG/ML
40 INJECTION INTRAMUSCULAR; INTRAVENOUS DAILY
Status: DISCONTINUED | OUTPATIENT
Start: 2023-01-01 | End: 2023-01-01

## 2023-01-01 RX ORDER — POLYETHYLENE GLYCOL 3350 17 G/17G
17 POWDER, FOR SOLUTION ORAL DAILY
Status: DISCONTINUED | OUTPATIENT
Start: 2023-01-01 | End: 2023-01-01 | Stop reason: HOSPADM

## 2023-01-01 RX ORDER — ONDANSETRON 4 MG/1
4 TABLET, ORALLY DISINTEGRATING ORAL EVERY 8 HOURS PRN
Status: DISCONTINUED | OUTPATIENT
Start: 2023-01-01 | End: 2023-01-01 | Stop reason: HOSPADM

## 2023-01-01 RX ORDER — PANTOPRAZOLE SODIUM 40 MG/10ML
40 INJECTION, POWDER, LYOPHILIZED, FOR SOLUTION INTRAVENOUS DAILY
Status: DISCONTINUED | OUTPATIENT
Start: 2023-01-01 | End: 2023-01-01 | Stop reason: HOSPADM

## 2023-01-01 RX ORDER — OMEPRAZOLE 20 MG/1
40 CAPSULE, DELAYED RELEASE ORAL DAILY
COMMUNITY

## 2023-01-01 RX ORDER — METOPROLOL SUCCINATE 25 MG/1
25 TABLET, EXTENDED RELEASE ORAL DAILY
Status: DISCONTINUED | OUTPATIENT
Start: 2023-01-01 | End: 2023-01-01 | Stop reason: HOSPADM

## 2023-01-01 RX ORDER — SODIUM CHLORIDE 9 MG/ML
INJECTION, SOLUTION INTRAVENOUS CONTINUOUS
Status: DISCONTINUED | OUTPATIENT
Start: 2023-01-01 | End: 2023-01-01

## 2023-01-01 RX ORDER — SODIUM CHLORIDE 9 MG/ML
INJECTION, SOLUTION INTRAVENOUS PRN
Status: DISCONTINUED | OUTPATIENT
Start: 2023-01-01 | End: 2023-01-01 | Stop reason: HOSPADM

## 2023-01-01 RX ORDER — ACETAMINOPHEN 325 MG/1
650 TABLET ORAL EVERY 6 HOURS PRN
Status: DISCONTINUED | OUTPATIENT
Start: 2023-01-01 | End: 2023-01-01 | Stop reason: HOSPADM

## 2023-01-01 RX ORDER — SENNA AND DOCUSATE SODIUM 50; 8.6 MG/1; MG/1
2 TABLET, FILM COATED ORAL DAILY
Status: DISCONTINUED | OUTPATIENT
Start: 2023-01-01 | End: 2023-01-01 | Stop reason: HOSPADM

## 2023-01-01 RX ORDER — FUROSEMIDE 10 MG/ML
20 INJECTION INTRAMUSCULAR; INTRAVENOUS ONCE
Status: DISCONTINUED | OUTPATIENT
Start: 2023-01-01 | End: 2023-01-01

## 2023-01-01 RX ORDER — SENNOSIDES A AND B 8.6 MG/1
1 TABLET, FILM COATED ORAL NIGHTLY
Status: DISCONTINUED | OUTPATIENT
Start: 2023-01-01 | End: 2023-01-01

## 2023-01-01 RX ORDER — ACETAMINOPHEN 650 MG/1
650 SUPPOSITORY RECTAL EVERY 6 HOURS PRN
Status: DISCONTINUED | OUTPATIENT
Start: 2023-01-01 | End: 2023-01-01 | Stop reason: HOSPADM

## 2023-01-01 RX ORDER — SODIUM CHLORIDE 0.9 % (FLUSH) 0.9 %
5-40 SYRINGE (ML) INJECTION EVERY 12 HOURS SCHEDULED
Status: DISCONTINUED | OUTPATIENT
Start: 2023-01-01 | End: 2023-01-01 | Stop reason: HOSPADM

## 2023-01-01 RX ORDER — ENOXAPARIN SODIUM 100 MG/ML
1 INJECTION SUBCUTANEOUS 2 TIMES DAILY
Status: DISCONTINUED | OUTPATIENT
Start: 2023-01-01 | End: 2023-01-01

## 2023-01-01 RX ORDER — FUROSEMIDE 10 MG/ML
20 INJECTION INTRAMUSCULAR; INTRAVENOUS ONCE
Status: COMPLETED | OUTPATIENT
Start: 2023-01-01 | End: 2023-01-01

## 2023-01-01 RX ORDER — ENOXAPARIN SODIUM 100 MG/ML
30 INJECTION SUBCUTANEOUS 2 TIMES DAILY
Status: DISCONTINUED | OUTPATIENT
Start: 2023-01-01 | End: 2023-01-01

## 2023-01-01 RX ADMIN — APIXABAN 5 MG: 5 TABLET, FILM COATED ORAL at 08:23

## 2023-01-01 RX ADMIN — Medication 10 ML: at 21:08

## 2023-01-01 RX ADMIN — TROSPIUM CHLORIDE 20 MG: 20 TABLET, FILM COATED ORAL at 17:23

## 2023-01-01 RX ADMIN — Medication 10 ML: at 08:05

## 2023-01-01 RX ADMIN — METOPROLOL SUCCINATE 25 MG: 25 TABLET, EXTENDED RELEASE ORAL at 11:45

## 2023-01-01 RX ADMIN — ARFORMOTEROL TARTRATE 15 MCG: 15 SOLUTION RESPIRATORY (INHALATION) at 20:15

## 2023-01-01 RX ADMIN — MICONAZOLE NITRATE: 20 POWDER TOPICAL at 08:57

## 2023-01-01 RX ADMIN — MORPHINE SULFATE 4 MG: 4 INJECTION, SOLUTION INTRAMUSCULAR; INTRAVENOUS at 10:15

## 2023-01-01 RX ADMIN — ARFORMOTEROL TARTRATE 15 MCG: 15 SOLUTION RESPIRATORY (INHALATION) at 09:12

## 2023-01-01 RX ADMIN — MICONAZOLE NITRATE: 20 POWDER TOPICAL at 08:18

## 2023-01-01 RX ADMIN — FUROSEMIDE 40 MG: 10 INJECTION, SOLUTION INTRAMUSCULAR; INTRAVENOUS at 08:37

## 2023-01-01 RX ADMIN — CEPHALEXIN 250 MG: 250 CAPSULE ORAL at 00:43

## 2023-01-01 RX ADMIN — WATER 2000 MG: 1 INJECTION INTRAMUSCULAR; INTRAVENOUS; SUBCUTANEOUS at 15:11

## 2023-01-01 RX ADMIN — ARFORMOTEROL TARTRATE 15 MCG: 15 SOLUTION RESPIRATORY (INHALATION) at 09:45

## 2023-01-01 RX ADMIN — POLYETHYLENE GLYCOL 3350 17 G: 17 POWDER, FOR SOLUTION ORAL at 08:56

## 2023-01-01 RX ADMIN — METOPROLOL SUCCINATE 25 MG: 25 TABLET, EXTENDED RELEASE ORAL at 08:23

## 2023-01-01 RX ADMIN — MICONAZOLE NITRATE: 20 POWDER TOPICAL at 07:36

## 2023-01-01 RX ADMIN — APIXABAN 5 MG: 5 TABLET, FILM COATED ORAL at 08:53

## 2023-01-01 RX ADMIN — POLYETHYLENE GLYCOL 3350 17 G: 17 POWDER, FOR SOLUTION ORAL at 08:23

## 2023-01-01 RX ADMIN — ACETAMINOPHEN 650 MG: 325 TABLET ORAL at 08:23

## 2023-01-01 RX ADMIN — Medication 10 ML: at 09:48

## 2023-01-01 RX ADMIN — APIXABAN 5 MG: 5 TABLET, FILM COATED ORAL at 20:58

## 2023-01-01 RX ADMIN — MICONAZOLE NITRATE: 20 POWDER TOPICAL at 22:29

## 2023-01-01 RX ADMIN — METOPROLOL SUCCINATE 25 MG: 25 TABLET, EXTENDED RELEASE ORAL at 09:39

## 2023-01-01 RX ADMIN — METOPROLOL SUCCINATE 25 MG: 25 TABLET, EXTENDED RELEASE ORAL at 08:02

## 2023-01-01 RX ADMIN — WATER 2000 MG: 1 INJECTION INTRAMUSCULAR; INTRAVENOUS; SUBCUTANEOUS at 00:21

## 2023-01-01 RX ADMIN — ARFORMOTEROL TARTRATE 15 MCG: 15 SOLUTION RESPIRATORY (INHALATION) at 20:37

## 2023-01-01 RX ADMIN — Medication 10 ML: at 08:13

## 2023-01-01 RX ADMIN — ARFORMOTEROL TARTRATE 15 MCG: 15 SOLUTION RESPIRATORY (INHALATION) at 08:38

## 2023-01-01 RX ADMIN — ARFORMOTEROL TARTRATE 15 MCG: 15 SOLUTION RESPIRATORY (INHALATION) at 20:06

## 2023-01-01 RX ADMIN — CEPHALEXIN 250 MG: 250 CAPSULE ORAL at 12:02

## 2023-01-01 RX ADMIN — BUDESONIDE INHALATION 500 MCG: 0.5 SUSPENSION RESPIRATORY (INHALATION) at 09:06

## 2023-01-01 RX ADMIN — FUROSEMIDE 40 MG: 10 INJECTION, SOLUTION INTRAMUSCULAR; INTRAVENOUS at 07:36

## 2023-01-01 RX ADMIN — Medication 10 ML: at 08:18

## 2023-01-01 RX ADMIN — CEPHALEXIN 250 MG: 250 CAPSULE ORAL at 05:54

## 2023-01-01 RX ADMIN — PANTOPRAZOLE SODIUM 40 MG: 40 INJECTION, POWDER, FOR SOLUTION INTRAVENOUS at 08:34

## 2023-01-01 RX ADMIN — CEPHALEXIN 250 MG: 250 CAPSULE ORAL at 14:04

## 2023-01-01 RX ADMIN — Medication 10 ML: at 08:57

## 2023-01-01 RX ADMIN — MICONAZOLE NITRATE: 20 POWDER TOPICAL at 20:31

## 2023-01-01 RX ADMIN — POLYETHYLENE GLYCOL 3350 17 G: 17 POWDER, FOR SOLUTION ORAL at 09:39

## 2023-01-01 RX ADMIN — PETROLATUM: 420 OINTMENT TOPICAL at 20:00

## 2023-01-01 RX ADMIN — CEPHALEXIN 250 MG: 250 CAPSULE ORAL at 05:10

## 2023-01-01 RX ADMIN — PETROLATUM: 420 OINTMENT TOPICAL at 09:47

## 2023-01-01 RX ADMIN — Medication 10 ML: at 07:37

## 2023-01-01 RX ADMIN — MICONAZOLE NITRATE: 20 POWDER TOPICAL at 08:15

## 2023-01-01 RX ADMIN — MORPHINE SULFATE 2 MG: 2 INJECTION, SOLUTION INTRAMUSCULAR; INTRAVENOUS at 18:27

## 2023-01-01 RX ADMIN — FUROSEMIDE 40 MG: 10 INJECTION, SOLUTION INTRAMUSCULAR; INTRAVENOUS at 18:29

## 2023-01-01 RX ADMIN — APIXABAN 5 MG: 5 TABLET, FILM COATED ORAL at 08:02

## 2023-01-01 RX ADMIN — CEPHALEXIN 250 MG: 250 CAPSULE ORAL at 18:07

## 2023-01-01 RX ADMIN — PETROLATUM: 420 OINTMENT TOPICAL at 08:56

## 2023-01-01 RX ADMIN — Medication 10 ML: at 20:01

## 2023-01-01 RX ADMIN — CEPHALEXIN 250 MG: 250 CAPSULE ORAL at 13:15

## 2023-01-01 RX ADMIN — BUDESONIDE INHALATION 500 MCG: 0.5 SUSPENSION RESPIRATORY (INHALATION) at 20:15

## 2023-01-01 RX ADMIN — PETROLATUM: 420 OINTMENT TOPICAL at 09:39

## 2023-01-01 RX ADMIN — MICONAZOLE NITRATE: 20 POWDER TOPICAL at 08:02

## 2023-01-01 RX ADMIN — Medication 10 ML: at 20:58

## 2023-01-01 RX ADMIN — CEPHALEXIN 250 MG: 250 CAPSULE ORAL at 23:59

## 2023-01-01 RX ADMIN — TROSPIUM CHLORIDE 20 MG: 20 TABLET, FILM COATED ORAL at 05:02

## 2023-01-01 RX ADMIN — WATER 2000 MG: 1 INJECTION INTRAMUSCULAR; INTRAVENOUS; SUBCUTANEOUS at 00:42

## 2023-01-01 RX ADMIN — ARFORMOTEROL TARTRATE 15 MCG: 15 SOLUTION RESPIRATORY (INHALATION) at 20:27

## 2023-01-01 RX ADMIN — Medication 10 ML: at 09:02

## 2023-01-01 RX ADMIN — MICONAZOLE NITRATE: 20 POWDER TOPICAL at 21:36

## 2023-01-01 RX ADMIN — CEPHALEXIN 250 MG: 250 CAPSULE ORAL at 05:02

## 2023-01-01 RX ADMIN — MICONAZOLE NITRATE: 20 POWDER TOPICAL at 09:01

## 2023-01-01 RX ADMIN — WATER 2000 MG: 1 INJECTION INTRAMUSCULAR; INTRAVENOUS; SUBCUTANEOUS at 08:56

## 2023-01-01 RX ADMIN — POLYETHYLENE GLYCOL 3350 17 G: 17 POWDER, FOR SOLUTION ORAL at 09:43

## 2023-01-01 RX ADMIN — PETROLATUM: 420 OINTMENT TOPICAL at 20:58

## 2023-01-01 RX ADMIN — PANTOPRAZOLE SODIUM 40 MG: 40 INJECTION, POWDER, FOR SOLUTION INTRAVENOUS at 09:44

## 2023-01-01 RX ADMIN — PANTOPRAZOLE SODIUM 40 MG: 40 INJECTION, POWDER, FOR SOLUTION INTRAVENOUS at 09:38

## 2023-01-01 RX ADMIN — PANTOPRAZOLE SODIUM 40 MG: 40 INJECTION, POWDER, FOR SOLUTION INTRAVENOUS at 20:28

## 2023-01-01 RX ADMIN — CEPHALEXIN 250 MG: 250 CAPSULE ORAL at 12:56

## 2023-01-01 RX ADMIN — ARFORMOTEROL TARTRATE 15 MCG: 15 SOLUTION RESPIRATORY (INHALATION) at 09:06

## 2023-01-01 RX ADMIN — FUROSEMIDE 40 MG: 10 INJECTION, SOLUTION INTRAMUSCULAR; INTRAVENOUS at 08:56

## 2023-01-01 RX ADMIN — CEPHALEXIN 250 MG: 250 CAPSULE ORAL at 00:37

## 2023-01-01 RX ADMIN — PETROLATUM: 420 OINTMENT TOPICAL at 08:18

## 2023-01-01 RX ADMIN — WATER 2000 MG: 1 INJECTION INTRAMUSCULAR; INTRAVENOUS; SUBCUTANEOUS at 08:14

## 2023-01-01 RX ADMIN — MORPHINE SULFATE 4 MG: 4 INJECTION, SOLUTION INTRAMUSCULAR; INTRAVENOUS at 20:55

## 2023-01-01 RX ADMIN — Medication 10 ML: at 09:40

## 2023-01-01 RX ADMIN — PANTOPRAZOLE SODIUM 40 MG: 40 INJECTION, POWDER, FOR SOLUTION INTRAVENOUS at 08:18

## 2023-01-01 RX ADMIN — ARFORMOTEROL TARTRATE 15 MCG: 15 SOLUTION RESPIRATORY (INHALATION) at 08:54

## 2023-01-01 RX ADMIN — MICONAZOLE NITRATE: 20 POWDER TOPICAL at 20:26

## 2023-01-01 RX ADMIN — FUROSEMIDE 40 MG: 10 INJECTION, SOLUTION INTRAMUSCULAR; INTRAVENOUS at 16:55

## 2023-01-01 RX ADMIN — Medication 10 ML: at 08:34

## 2023-01-01 RX ADMIN — PETROLATUM: 420 OINTMENT TOPICAL at 07:36

## 2023-01-01 RX ADMIN — APIXABAN 5 MG: 5 TABLET, FILM COATED ORAL at 21:07

## 2023-01-01 RX ADMIN — MICONAZOLE NITRATE: 20 POWDER TOPICAL at 20:28

## 2023-01-01 RX ADMIN — PETROLATUM: 420 OINTMENT TOPICAL at 08:02

## 2023-01-01 RX ADMIN — FUROSEMIDE 40 MG: 10 INJECTION, SOLUTION INTRAMUSCULAR; INTRAVENOUS at 17:15

## 2023-01-01 RX ADMIN — CEPHALEXIN 250 MG: 250 CAPSULE ORAL at 17:23

## 2023-01-01 RX ADMIN — BUDESONIDE INHALATION 500 MCG: 0.5 SUSPENSION RESPIRATORY (INHALATION) at 08:54

## 2023-01-01 RX ADMIN — Medication 10 ML: at 20:28

## 2023-01-01 RX ADMIN — BUDESONIDE INHALATION 500 MCG: 0.5 SUSPENSION RESPIRATORY (INHALATION) at 09:12

## 2023-01-01 RX ADMIN — Medication 500 MG: at 09:43

## 2023-01-01 RX ADMIN — PANTOPRAZOLE SODIUM 40 MG: 40 INJECTION, POWDER, FOR SOLUTION INTRAVENOUS at 08:54

## 2023-01-01 RX ADMIN — Medication 10 ML: at 21:36

## 2023-01-01 RX ADMIN — MICONAZOLE NITRATE: 20 POWDER TOPICAL at 20:58

## 2023-01-01 RX ADMIN — FUROSEMIDE 40 MG: 10 INJECTION, SOLUTION INTRAMUSCULAR; INTRAVENOUS at 18:39

## 2023-01-01 RX ADMIN — PANTOPRAZOLE SODIUM 40 MG: 40 INJECTION, POWDER, FOR SOLUTION INTRAVENOUS at 07:37

## 2023-01-01 RX ADMIN — APIXABAN 5 MG: 5 TABLET, FILM COATED ORAL at 09:39

## 2023-01-01 RX ADMIN — APIXABAN 5 MG: 5 TABLET, FILM COATED ORAL at 09:44

## 2023-01-01 RX ADMIN — FUROSEMIDE 40 MG: 10 INJECTION, SOLUTION INTRAMUSCULAR; INTRAVENOUS at 09:38

## 2023-01-01 RX ADMIN — IOPAMIDOL 75 ML: 755 INJECTION, SOLUTION INTRAVENOUS at 17:53

## 2023-01-01 RX ADMIN — APIXABAN 5 MG: 5 TABLET, FILM COATED ORAL at 20:26

## 2023-01-01 RX ADMIN — PETROLATUM: 420 OINTMENT TOPICAL at 08:39

## 2023-01-01 RX ADMIN — FUROSEMIDE 40 MG: 10 INJECTION, SOLUTION INTRAMUSCULAR; INTRAVENOUS at 08:02

## 2023-01-01 RX ADMIN — METOPROLOL SUCCINATE 25 MG: 25 TABLET, EXTENDED RELEASE ORAL at 08:56

## 2023-01-01 RX ADMIN — PETROLATUM: 420 OINTMENT TOPICAL at 08:57

## 2023-01-01 RX ADMIN — APIXABAN 5 MG: 5 TABLET, FILM COATED ORAL at 08:34

## 2023-01-01 RX ADMIN — MICONAZOLE NITRATE: 20 POWDER TOPICAL at 08:39

## 2023-01-01 RX ADMIN — ENOXAPARIN SODIUM 30 MG: 100 INJECTION SUBCUTANEOUS at 22:08

## 2023-01-01 RX ADMIN — CEPHALEXIN 250 MG: 250 CAPSULE ORAL at 18:38

## 2023-01-01 RX ADMIN — IPRATROPIUM BROMIDE AND ALBUTEROL SULFATE 3 DOSE: .5; 2.5 SOLUTION RESPIRATORY (INHALATION) at 16:14

## 2023-01-01 RX ADMIN — PETROLATUM: 420 OINTMENT TOPICAL at 21:07

## 2023-01-01 RX ADMIN — TROSPIUM CHLORIDE 20 MG: 20 TABLET, FILM COATED ORAL at 05:46

## 2023-01-01 RX ADMIN — ENOXAPARIN SODIUM 30 MG: 100 INJECTION SUBCUTANEOUS at 08:14

## 2023-01-01 RX ADMIN — PETROLATUM: 420 OINTMENT TOPICAL at 22:30

## 2023-01-01 RX ADMIN — PETROLATUM: 420 OINTMENT TOPICAL at 21:37

## 2023-01-01 RX ADMIN — CEPHALEXIN 250 MG: 250 CAPSULE ORAL at 05:35

## 2023-01-01 RX ADMIN — ARFORMOTEROL TARTRATE 15 MCG: 15 SOLUTION RESPIRATORY (INHALATION) at 20:08

## 2023-01-01 RX ADMIN — FUROSEMIDE 20 MG: 10 INJECTION, SOLUTION INTRAMUSCULAR; INTRAVENOUS at 00:36

## 2023-01-01 RX ADMIN — Medication 10 ML: at 20:31

## 2023-01-01 RX ADMIN — METOPROLOL SUCCINATE 25 MG: 25 TABLET, EXTENDED RELEASE ORAL at 08:35

## 2023-01-01 RX ADMIN — LACTULOSE 20 G: 20 SOLUTION ORAL at 19:16

## 2023-01-01 RX ADMIN — APIXABAN 5 MG: 5 TABLET, FILM COATED ORAL at 22:31

## 2023-01-01 RX ADMIN — FUROSEMIDE 40 MG: 10 INJECTION, SOLUTION INTRAMUSCULAR; INTRAVENOUS at 21:36

## 2023-01-01 RX ADMIN — PETROLATUM: 420 OINTMENT TOPICAL at 20:29

## 2023-01-01 RX ADMIN — BUDESONIDE INHALATION 500 MCG: 0.5 SUSPENSION RESPIRATORY (INHALATION) at 20:37

## 2023-01-01 RX ADMIN — BUDESONIDE INHALATION 500 MCG: 0.5 SUSPENSION RESPIRATORY (INHALATION) at 20:08

## 2023-01-01 RX ADMIN — Medication 10 ML: at 20:26

## 2023-01-01 RX ADMIN — BUDESONIDE INHALATION 500 MCG: 0.5 SUSPENSION RESPIRATORY (INHALATION) at 20:27

## 2023-01-01 RX ADMIN — BUDESONIDE INHALATION 500 MCG: 0.5 SUSPENSION RESPIRATORY (INHALATION) at 08:38

## 2023-01-01 RX ADMIN — BUDESONIDE INHALATION 500 MCG: 0.5 SUSPENSION RESPIRATORY (INHALATION) at 20:06

## 2023-01-01 RX ADMIN — FUROSEMIDE 40 MG: 10 INJECTION, SOLUTION INTRAMUSCULAR; INTRAVENOUS at 08:15

## 2023-01-01 RX ADMIN — Medication 500 MG: at 08:23

## 2023-01-01 RX ADMIN — ONDANSETRON 4 MG: 4 TABLET, ORALLY DISINTEGRATING ORAL at 12:56

## 2023-01-01 RX ADMIN — CEPHALEXIN 250 MG: 250 CAPSULE ORAL at 00:05

## 2023-01-01 RX ADMIN — MICONAZOLE NITRATE: 20 POWDER TOPICAL at 20:00

## 2023-01-01 RX ADMIN — ALBUTEROL SULFATE 2.5 MG: 2.5 SOLUTION RESPIRATORY (INHALATION) at 13:43

## 2023-01-01 RX ADMIN — Medication 10 ML: at 22:30

## 2023-01-01 RX ADMIN — POLYETHYLENE GLYCOL 3350 17 G: 17 POWDER, FOR SOLUTION ORAL at 08:02

## 2023-01-01 RX ADMIN — BUDESONIDE INHALATION 500 MCG: 0.5 SUSPENSION RESPIRATORY (INHALATION) at 09:45

## 2023-01-01 RX ADMIN — CEPHALEXIN 250 MG: 250 CAPSULE ORAL at 14:40

## 2023-01-01 RX ADMIN — APIXABAN 5 MG: 5 TABLET, FILM COATED ORAL at 20:28

## 2023-01-01 RX ADMIN — METOPROLOL SUCCINATE 25 MG: 25 TABLET, EXTENDED RELEASE ORAL at 09:43

## 2023-01-01 RX ADMIN — TROSPIUM CHLORIDE 20 MG: 20 TABLET, FILM COATED ORAL at 15:43

## 2023-01-01 RX ADMIN — APIXABAN 5 MG: 5 TABLET, FILM COATED ORAL at 19:59

## 2023-01-01 RX ADMIN — MICONAZOLE NITRATE: 20 POWDER TOPICAL at 09:40

## 2023-01-01 RX ADMIN — CEPHALEXIN 250 MG: 250 CAPSULE ORAL at 18:29

## 2023-01-01 RX ADMIN — PETROLATUM: 420 OINTMENT TOPICAL at 20:31

## 2023-01-01 RX ADMIN — MICONAZOLE NITRATE: 20 POWDER TOPICAL at 21:08

## 2023-01-01 RX ADMIN — APIXABAN 5 MG: 5 TABLET, FILM COATED ORAL at 21:36

## 2023-01-01 RX ADMIN — Medication 10 ML: at 00:36

## 2023-01-01 RX ADMIN — POLYETHYLENE GLYCOL 3350 17 G: 17 POWDER, FOR SOLUTION ORAL at 08:57

## 2023-01-01 RX ADMIN — CEPHALEXIN 250 MG: 250 CAPSULE ORAL at 05:46

## 2023-01-01 RX ADMIN — MICONAZOLE NITRATE: 20 POWDER TOPICAL at 09:48

## 2023-01-01 RX ADMIN — SODIUM CHLORIDE: 9 INJECTION, SOLUTION INTRAVENOUS at 18:57

## 2023-01-01 RX ADMIN — METOPROLOL SUCCINATE 25 MG: 25 TABLET, EXTENDED RELEASE ORAL at 08:54

## 2023-01-01 RX ADMIN — APIXABAN 5 MG: 5 TABLET, FILM COATED ORAL at 08:56

## 2023-01-01 RX ADMIN — CEPHALEXIN 250 MG: 250 CAPSULE ORAL at 21:35

## 2023-01-01 RX ADMIN — Medication 500 MG: at 08:54

## 2023-01-01 RX ADMIN — CEPHALEXIN 250 MG: 250 CAPSULE ORAL at 12:52

## 2023-01-01 RX ADMIN — PETROLATUM: 420 OINTMENT TOPICAL at 20:26

## 2023-01-01 RX ADMIN — FUROSEMIDE 40 MG: 10 INJECTION, SOLUTION INTRAMUSCULAR; INTRAVENOUS at 16:14

## 2023-01-01 ASSESSMENT — PAIN DESCRIPTION - ORIENTATION
ORIENTATION: LEFT;LOWER
ORIENTATION: LEFT

## 2023-01-01 ASSESSMENT — PAIN SCALES - GENERAL
PAINLEVEL_OUTOF10: 0
PAINLEVEL_OUTOF10: 0
PAINLEVEL_OUTOF10: 6
PAINLEVEL_OUTOF10: 9
PAINLEVEL_OUTOF10: 0
PAINLEVEL_OUTOF10: 10
PAINLEVEL_OUTOF10: 0
PAINLEVEL_OUTOF10: 9
PAINLEVEL_OUTOF10: 0
PAINLEVEL_OUTOF10: 0
PAINLEVEL_OUTOF10: 6
PAINLEVEL_OUTOF10: 0

## 2023-01-01 ASSESSMENT — PAIN DESCRIPTION - ONSET: ONSET: ON-GOING

## 2023-01-01 ASSESSMENT — PAIN DESCRIPTION - DESCRIPTORS
DESCRIPTORS: ACHING
DESCRIPTORS: ACHING

## 2023-01-01 ASSESSMENT — PAIN - FUNCTIONAL ASSESSMENT
PAIN_FUNCTIONAL_ASSESSMENT: PREVENTS OR INTERFERES SOME ACTIVE ACTIVITIES AND ADLS
PAIN_FUNCTIONAL_ASSESSMENT: PREVENTS OR INTERFERES SOME ACTIVE ACTIVITIES AND ADLS
PAIN_FUNCTIONAL_ASSESSMENT: NONE - DENIES PAIN

## 2023-01-01 ASSESSMENT — PAIN DESCRIPTION - LOCATION
LOCATION: ABDOMEN

## 2023-01-01 ASSESSMENT — PAIN DESCRIPTION - FREQUENCY: FREQUENCY: INTERMITTENT

## 2023-01-01 ASSESSMENT — PAIN DESCRIPTION - PAIN TYPE: TYPE: ACUTE PAIN

## 2023-07-10 PROBLEM — I48.91 A-FIB (HCC): Status: ACTIVE | Noted: 2023-01-01

## 2023-07-10 PROBLEM — I48.91 ATRIAL FIBRILLATION (HCC): Status: ACTIVE | Noted: 2023-01-01

## 2023-07-10 NOTE — ED PROVIDER NOTES
[facility] [place] INTERNAL MEDICINE [facility] Name: Tara Plunkett  MRN: [id number]  [address] [facility] 1940  Date of evaluation: 7/10/2023  Provider: All Naranjo DO  PCP: Ross Esquivel DO  Note Started: 3:37 PM EDT 7/10/23    CHIEF COMPLAINT       Chief Complaint   Patient presents with    Altered Mental Status     Sent in for increased confusion       HISTORY OF PRESENT ILLNESS: 1 or more Elements   History From: patient    Limitations to history : None    Tara Plunkett is a 78 y.o. male with PMH of asthma, COPD, CHF, lymphedema, GERD, prediabetes who presents with a complaint of decreased appetite, altered mental status and generalized weakness. Patient is hard of hearing and history mostly obtained from daughters at bedside. Patient coming from nursing home where an nursing reported the patient had the symptoms starting yesterday. Daughter states that today he is not demonstrating any confusion. Patient states that he does feel somewhat more short of breath the past couple days and more fatigued. He also admits to some generalized abdominal pain. Nursing Notes were all reviewed and agreed with or any disagreements were addressed in the HPI.     REVIEW OF SYSTEMS :      Review of Systems    POSITIVE (+): Shortness of breath, abdominal pain, generalized weakness, decreased appetite  NEGATIVE (-): Fevers, chills, nausea, vomiting, diarrhea, constipation, chest pain    SURGICAL HISTORY     Past Surgical History:   Procedure Laterality Date    COLONOSCOPY      ECHO COMPL W DOP COLOR FLOW  10/21/2011         ECHO COMPL W DOP COLOR FLOW  10/12/2012         HERNIA REPAIR      POLYPECTOMY      WRIST SURGERY          [facility]       Current Discharge Medication List        CONTINUE these medications which have NOT CHANGED    Details   folic acid (FOLVITE) 1 MG tablet Take 40 tablets by mouth daily      omeprazole (PRILOSEC) 20 MG delayed release capsule Take 2 capsules by administration in time range)   perflutren lipid microspheres (DEFINITY) injection 1.5 mL (has no administration in time range)   furosemide (LASIX) injection 40 mg (has no administration in time range)   ceFAZolin (ANCEF) 2,000 mg in sterile water 20 mL IV syringe (has no administration in time range)   iopamidol (ISOVUE-370) 76 % injection 75 mL (75 mLs IntraVENous Given 7/10/23 1753)     Shortly after arrival patient was given multiple DuoNeb treatments for some shortness of breath and diminished breath sounds. Labs were ordered to evaluate for the above differential and patient labs were significant for a white count of 5.5, hemoglobin of 10.8, troponin of 29 that was delta negative, urinalysis within normal limits, lipase of 10, BNP of 1025, potassium of 5.2, lactic of 1.2. Labs interpreted by me. EKG indicated atrial fibrillation which is a new diagnosis for the patient. Throughout the patient's visit patient was very somnolent although vitals remaining stable. Family at bedside states this is not his normal.  Decision was made to admit the patient for further treatment. CONSULTS: (Who and What was discussed)  IP CONSULT TO CARDIOLOGY  IP CONSULT TO IV TEAM  \    Social Determinants : None      Records Reviewed (source and summary):   ECHO from 8/25/14      Left Ventricle   Left ventricular size is grossly normal.   Mild left ventricular concentric hypertrophy noted. Ejection fraction is visually estimated at 50%. Normal left ventricular ejection fraction. E/A flow reversal noted. Suggestive of diastolic dysfunction. I am the Primary Clinician of Record. FINAL IMPRESSION      1. Altered mental status, unspecified altered mental status type    2. New onset atrial fibrillation (HCC)    3. Cellulitis of lower extremity, unspecified laterality          DISPOSITION/PLAN     DISPOSITION Admitted 07/10/2023 08:10:48 PM      PATIENT REFERRED TO:  No follow-up provider specified.     DISCHARGE

## 2023-07-11 PROBLEM — J96.21 ACUTE ON CHRONIC RESPIRATORY FAILURE WITH HYPOXIA (HCC): Status: ACTIVE | Noted: 2023-01-01

## 2023-07-11 PROBLEM — L03.119 CELLULITIS OF LOWER EXTREMITY: Status: ACTIVE | Noted: 2023-01-01

## 2023-07-11 PROBLEM — I45.10 RBBB: Status: ACTIVE | Noted: 2023-01-01

## 2023-07-11 PROBLEM — R41.82 ALTERED MENTAL STATUS: Status: ACTIVE | Noted: 2023-01-01

## 2023-07-11 PROBLEM — G47.33 OSA (OBSTRUCTIVE SLEEP APNEA): Status: ACTIVE | Noted: 2023-01-01

## 2023-07-11 PROBLEM — R77.8 ELEVATED TROPONIN: Status: ACTIVE | Noted: 2023-01-01

## 2023-07-11 PROBLEM — I50.33 ACUTE ON CHRONIC HEART FAILURE WITH PRESERVED EJECTION FRACTION (HFPEF) (HCC): Status: ACTIVE | Noted: 2023-01-01

## 2023-07-11 NOTE — PROGRESS NOTES
Initial Inpatient Wound Care    Admit Date: 7/10/2023  2:46 PM    Reason for consult:  purple buttock area, non blanchable    Significant history:  adm generalized weakness, AMS  From assisted living  Hx asthma, COPD, CHF  Sits a lot in chair, sleeps in recliner  Wound history:  POA    Findings:  patient awake and verbally appropriate  Heels intact. Bilateral lower legs edematous. Some redness, no open areas or drainage.   Primo fit in place  Inner right buttocks      Large non blanchable purple 8x3  Impression:  DTI rt buttocks POA    Interventions in place:  [facility] air loss bed in place  SOS    Plan:aquabiodun Hernandez RN 7/11/2023 3:31 PM

## 2023-07-11 NOTE — ACP (ADVANCE CARE PLANNING)
Advance Care Planning   Healthcare Decision Maker:    Primary Decision Maker: Chris Ortega [place] - Daughter-in-Law - [phone]    Click here to complete Healthcare Decision Makers including selection of the Healthcare Decision Maker Relationship (ie \"Primary\"). Today we documented Decision Maker(s) consistent with ACP documents on file.        If the relationship to the patient does NOT follow our state's Next of Kin hierarchy, the patient MUST complete an ACP Document to allow him/her to act on the patient's behalf. :

## 2023-07-11 NOTE — CARE COORDINATION
Introduced my self and provided explanation of CM role to patient. Patient is awake, alert, and aware of current diagnosis and treatment plan including IV diuretics, cardiac ultrasound, new anticoagulation therapy. He voices he resides at [facility] and plans on returning there at discharge. Spoke with Elzia Danielle at [facility], patient can return but will need PT/OT evals. PT/OT ordered for recommendations. Patient is established with a pcp and denies any issue with retail pharmaceutical coverage. Patient verbalizes no concerns and identifies no areas to focus on nor barriers to discharge. Explained ELOS of >48 hours; patient voiced understanding and agreement. Will follow along with  and assist with discharge planning as necessary.    Electronically signed by Jenae Seo on 7/11/2023 at 11:09 AM

## 2023-07-11 NOTE — PROGRESS NOTES
Occupational Therapy  OCCUPATIONAL THERAPY INITIAL EVALUATION   On license of [address]  [address] [place], [place]    Date: 2023     Patient Name: Jeanna Do  MRN: [id number]  : 1940  Room: [address] [place] [address]    Evaluating OT: SPENCER Martinez/CARLOS - [id number]    Referring Provider: Maria Del Carmen Valenzuela DO  Specific Provider Orders/Date: \"OT eval and treat\" - 2023    Diagnosis: Atrial fibrillation ([address]) [I48.91]  A-fib ([address]) [I48.91]      Pertinent Medical History: TKA, spastic bladder, wrist surgery     Precautions: fall risk, skin integrity    Assessment of Current Deficits:    [x] Functional mobility   [x]ADLs  [x] Strength               [x]Cognition   [x] Functional transfers   [x] IADLs         [x] Safety Awareness   [x]Endurance   [] Fine Coordination              [x] Balance      [] Vision/perception   []Sensation    []Gross Motor Coordination  [] ROM  [] Delirium                   [] Motor Control     OT PLAN OF CARE   OT POC is based on physician orders, patient diagnosis, and results of clinical assessment.   Frequency/Duration 1-3 days/week for 2 weeks PRN   Specific OT Treatment Interventions to Include:   * Instruction/training on adapted ADL techniques and AE recommendations to increase functional independence within precautions       * Training on energy conservation strategies, correct breathing pattern and techniques to improve independence/tolerance for self-care routine  * Functional transfer/mobility training/DME recommendations for increased independence, safety, and fall prevention  * Patient/Family education to increase follow through with safety techniques and functional independence  * Recommendation of environmental modifications for increased safety with functional transfers/mobility and ADLs  * Therapeutic exercise to improve motor endurance, ROM, and functional strength for ADLs/functional transfers  * Therapeutic activities to with total assistance. Patient required total assistance x 2 for repositioning for comfort and skin integrity. Patient's family present, call light and phone within reach and all lines and tubes intact. Patient would benefit from continued skilled OT to increase safety and independence with completion of ADL/IADL tasks for functional independence and quality of life. Treatment: OT treatment provided this date included:   Instruction/training on safety and adapted techniques for completion of ADLs. Instruction/training on energy conservation/work simplification for completion of ADLs. Instruction/training on bed mobility and dynamic balance for increased independence with self care    Facilitation of proper Positioning/Alignment       Patient education provided regardin) OT role/purpose and plan of care 2)use of call light for assistance 3)positioning for comfort/function 4)benefits of OOB. Patient verbalized understanding. Further skilled OT treatment indicated to increase patient's safety and independence with completion of ADL/IADL tasks in order to maximize patient's functional independence and quality of life. Rehab Potential: Good for established goals. Patient / Family Goal: to move better  Patient and/or family were instructed on functional diagnosis, prognosis/goals, and OT plan of care. Verbalized  understanding.     Eval Complexity: low    Time In: 1425  Time Out: 1435  Time In: 1505  Time Out: 1530  Total Treatment Time: 20 minutes      Minutes Units   OT Eval Low 02762 15 1   OT Eval Medium 37581     OT Eval High 92864     OT Re-Eval [id number]     Therapeutic Ex 70800     Therapeutic Activities 61929 20 1   ADL/Self Care 05518     Orthotic Management 52739     Neuro Re-Ed 52138     Non-Billable Time N/A ---     Evaluation time includes thorough review of current medical information, gathering information on past medical history/social history and prior level of function, completion of

## 2023-07-11 NOTE — H&P
[facility] History and Physical      CHIEF COMPLAINT: Generalized weakness    History of Present Illness: 52-year-old male with a past medical history of asthma, COPD, and CHF presents to the emergency department for generalized weakness, altered mental status, and fatigue. Patient's family states that he has been not acting himself however is able to answer questions appropriately during physical exam.  Patient comes from assisted living facility where nursing home reported symptoms began yesterday. Patient states that he has no feelings in his legs secondary to neuropathy. Patient states that he uses a walker at baseline. Patient denies nausea, vomiting, diarrhea, shortness of breath, cough, headache, numbness, or tingling. Informant(s) for H&P: Patient    REVIEW OF SYSTEMS:  A comprehensive review of systems was negative except for: what is in the HPI      PMH:  Past Medical History:   Diagnosis Date    Knee joint replacement by other means     Spastic bladder        Surgical History:  Past Surgical History:   Procedure Laterality Date    COLONOSCOPY      ECHO COMPL W DOP COLOR FLOW  10/21/2011         ECHO COMPL W DOP COLOR FLOW  10/12/2012         HERNIA REPAIR      POLYPECTOMY      WRIST SURGERY         Medications Prior to Admission:    Prior to Admission medications    Medication Sig Start Date End Date Taking?  Authorizing Provider   folic acid (FOLVITE) 1 MG tablet Take 40 tablets by mouth daily   Yes Historical Provider, MD   omeprazole (PRILOSEC) 20 MG delayed release capsule Take 2 capsules by mouth daily   Yes Historical Provider, MD   potassium chloride (KLOR-CON M) 20 MEQ extended release tablet Take 1 tablet by mouth daily   Yes Historical Provider, MD   Multiple Vitamins-Minerals (PRESERVISION AREDS) TABS Take by mouth   Yes Historical Provider, MD   budesonide-formoterol (SYMBICORT) 160-4.5 MCG/ACT AERO Inhale 2 puffs into the lungs 2 times daily   Yes Historical HCT 37.8   MCV 82.9   MCH 23.7*   MCHC 28.6*   RDW 18.5*      MPV 8.7       No results for input(s): POCGLU in the last 72 hours. CBC:   Lab Results   Component Value Date/Time    WBC 5.5 07/10/2023 03:17 PM    RBC 4.56 07/10/2023 03:17 PM    HGB 10.8 07/10/2023 03:17 PM    HCT 37.8 07/10/2023 03:17 PM    MCV 82.9 07/10/2023 03:17 PM    MCH 23.7 07/10/2023 03:17 PM    MCHC 28.6 07/10/2023 03:17 PM    RDW 18.5 07/10/2023 03:17 PM     07/10/2023 03:17 PM    MPV 8.7 07/10/2023 03:17 PM     CMP:    Lab Results   Component Value Date/Time     07/10/2023 03:17 PM    K 5.2 07/10/2023 03:17 PM    CL 99 07/10/2023 03:17 PM    CO2 32 07/10/2023 03:17 PM    BUN 21 07/10/2023 03:17 PM    CREATININE 0.9 07/10/2023 03:17 PM    GFRAA >60 09/08/2022 01:41 PM    LABGLOM >60 07/10/2023 03:17 PM    GLUCOSE 128 07/10/2023 06:03 PM    GLUCOSE 160 10/18/2011 10:30 PM    PROT 6.9 07/10/2023 03:17 PM    LABALBU 3.4 07/10/2023 03:17 PM    LABALBU 4.2 10/18/2011 10:30 PM    CALCIUM 9.1 07/10/2023 03:17 PM    BILITOT 0.5 07/10/2023 03:17 PM    ALKPHOS 99 07/10/2023 03:17 PM    AST 17 07/10/2023 03:17 PM    ALT 10 07/10/2023 03:17 PM       Radiology:   CT HEAD WO CONTRAST   Final Result   No acute intracranial abnormality. CT ABDOMEN PELVIS W IV CONTRAST Additional Contrast? None   Final Result   Colonic fecal retention otherwise no definitive findings to explain the   patient's symptoms. Cholelithiasis. XR CHEST PORTABLE   Final Result   Suspect early/mild CHF. Superimposed pneumonia cannot be excluded. EKG:     ASSESSMENT:      Principal Problem:    Atrial fibrillation (HCC)  Active Problems:    A-fib (HCC)  Resolved Problems:    * No resolved hospital problems. *      PLAN:    1. Bilateral lower extremity cellulitis-continue Ancef. Monitor cultures. Monitor labs daily. 2.  New onset A-fib-monitor telemetry. Cardiology consultation. Currently rate controlled.   Not on

## 2023-07-11 NOTE — PLAN OF CARE
Problem: Safety - Adult  Goal: Free from fall injury  Outcome: Progressing     Problem: Skin/Tissue Integrity  Goal: Absence of new skin breakdown  Description: 1. Monitor for areas of redness and/or skin breakdown  2. Assess vascular access sites hourly  3. Every 4-6 hours minimum:  Change oxygen saturation probe site  4. Every 4-6 hours:  If on nasal continuous positive airway pressure, respiratory therapy assess nares and determine need for appliance change or resting period. Outcome: Progressing     Problem: Skin/Tissue Integrity  Goal: Absence of new skin breakdown  Description: 1. Monitor for areas of redness and/or skin breakdown  2. Assess vascular access sites hourly  3. Every 4-6 hours minimum:  Change oxygen saturation probe site  4. Every 4-6 hours:  If on nasal continuous positive airway pressure, respiratory therapy assess nares and determine need for appliance change or resting period.   Outcome: Progressing

## 2023-07-11 NOTE — PROGRESS NOTES
4 Eyes Skin Assessment     NAME:  Shelby Palomo  YOB: 1940  MEDICAL RECORD NUMBER:  [id number]    The patient is being assessed for  {Reason for Assessment:72856}    I agree that at least one RN has performed a thorough Head to Toe Skin Assessment on the patient. ALL assessment sites listed below have been assessed. Areas assessed by both nurses:    Head, Face, Ears, Shoulders, Back, Chest, Arms, Elbows, Hands, Sacrum. Buttock, Coccyx, Ischium, Legs. Feet and Heels, and Under Medical Devices         Does the Patient have a Wound? Yes wound(s) were present on assessment.  LDA wound assessment was Initiated and completed by RN       Ambrosio Prevention initiated by RN: Yes  Wound Care Orders initiated by RN: Yes    Pressure Injury (Stage 3,4, Unstageable, DTI, NWPT, and Complex wounds) if present, place Wound referral order by RN under : Yes    New Ostomies, if present place, Ostomy referral order under : No     Nurse 1 eSignature: Electronically signed by Cheli Jung RN on 7/11/23 at 5:30 AM EDT    **SHARE this note so that the co-signing nurse can place an eSignature**    Nurse 2 eSignature: {Esignature:[id number]}

## 2023-07-12 NOTE — PROGRESS NOTES
Physical Therapy  Facility/Department: [address] INTERNAL MEDICINE 2    Name: Juan Pablo Prince  : 1940  MRN: [id number]  Date of Service: 2023    Attempted to see pt for PT evaluation x2. Pt had finished breakfast, PT eval initiated but then more breakfast was brought to pt (cereal and fruit). Pt did not want to get up until eating his extra food.   Attempted second time but pt in need of hygiene (incontinent of stool)  Fozia Rodriguez PT [id number]

## 2023-07-12 NOTE — PLAN OF CARE
Problem: Discharge Planning  Goal: Discharge to home or other facility with appropriate resources  Outcome: Progressing  Flowsheets (Taken 7/11/2023 2141)  Discharge to home or other facility with appropriate resources: Identify barriers to discharge with patient and caregiver     Problem: Safety - Adult  Goal: Free from fall injury  7/11/2023 2141 by Zeke Jiménez  Outcome: Progressing  Flowsheets (Taken 7/11/2023 2141)  Free From Fall Injury: Instruct family/caregiver on patient safety  7/11/2023 1040 by Bevelyn Gaucher, RN  Outcome: Progressing     Problem: Skin/Tissue Integrity  Goal: Absence of new skin breakdown  Description: 1. Monitor for areas of redness and/or skin breakdown  2. Assess vascular access sites hourly  3. Every 4-6 hours minimum:  Change oxygen saturation probe site  4. Every 4-6 hours:  If on nasal continuous positive airway pressure, respiratory therapy assess nares and determine need for appliance change or resting period.   7/11/2023 2141 by Zeke Jiménez  Outcome: Progressing  7/11/2023 1040 by Bevelyn Gaucher, RN  Outcome: Progressing     Problem: Pain  Goal: Verbalizes/displays adequate comfort level or baseline comfort level  Outcome: Progressing  Flowsheets (Taken 7/11/2023 2141)  Verbalizes/displays adequate comfort level or baseline comfort level:   Encourage patient to monitor pain and request assistance   Assess pain using appropriate pain scale   Administer analgesics based on type and severity of pain and evaluate response

## 2023-07-12 NOTE — PROGRESS NOTES
Physician Progress Note      Alcides Adams  [facility] #:                  [id number]  :                       1940  ADMIT DATE:       7/10/2023 2:46 PM  [address] [facility] DATE:  RESPONDING  PROVIDER #:        Bora Nunes MD          QUERY TEXT:    Patient admitted with CHF. Noted documentation of acute respiratory failure in   Cardiology consult on . In order to support the diagnosis of acute   respiratory failure, please include additional clinical indicators in your   documentation. Or please document if the diagnosis of acute respiratory   failure has been ruled out after further study. The medical record reflects the following:  Risk Factors: CHF  Clinical Indicators: RR 14-22, pulse ox 96%,  per ER note \". .. Effort:   Pulmonary effort is normal. No respiratory distress. Breath sounds: Wheezing   and rales present. No rhonchi. ..\", per H&P \". ..in no acute   distress. Kojo Fothergill Kojo Fothergill Pulmonary/Chest:  poor effort, Rales bilaterally. Kojo Fothergill Kojo Fothergill \", and per   Cardiology consult \". Kojo Fothergill Kojo Fothergill Acute on chronic hypoxic respiratory failure: On 4 L   nasal cannula. Kojo Fothergill Kojo Fothergill \"  Treatment: oxygen therapy    Acute Respiratory Failure Clinical Indicators per  MS-DRG Training Guide and   Quick Reference Guide:  pO2 < 60 mmHg or SpO2 (pulse oximetry) < 91% breathing room air  pCO2 > 50 and pH < 7.35  P/F ratio (pO2 / FIO2) < 300  pO2 decrease or pCO2 increase by 10 mmHg from baseline (if known)  Supplemental oxygen of 40% or more  Presence of respiratory distress, tachypnea, dyspnea, shortness of breath,   wheezing  Unable to speak in complete sentences  Use of accessory muscles to breathe  Extreme anxiety and feeling of impending doom  Tripod position  Confusion/altered mental status/obtunded    Thank you,  Elvi RITTER, RN, CDIS  Clinical Documentation Integrity  [email or web address]  Options provided:  -- Acute Respiratory Failure as evidenced by, Please document evidence.   -- Acute Respiratory Failure ruled out after study and

## 2023-07-12 NOTE — PROGRESS NOTES
INPATIENT CARDIOLOGY FOLLOW-UP    Name: Claribel Bautista    Age: 78 y.o. Date of Admission: 7/10/2023  2:46 PM    Date of Service: 7/12/2023    Chief Complaint: Follow-up for acute HFpEF, atrial fibrillation    Interim History:  Currently with no chest pain, respiratory distress, or palpitations. Rate controlled AF on EKG and telemetry. No new overnight cardiac complaints. Reported I/O's net negative 4.0 L thus far.     Review of Systems:   Cardiac: As per HPI  General: No fever, chills  Pulmonary: As per HPI  HEENT: No visual disturbances, difficult swallowing  GI: No nausea, vomiting  : No dysuria, hematuria  Endocrine: No thyroid disease or DM  Musculoskeletal: AYON x 4, no focal motor deficits  Skin: Intact, no rashes  Neuro: No headache, seizures  Psych: Currently with no depression, anxiety    Problem List:  Patient Active Problem List   Diagnosis    Atrial fibrillation ([address])    New onset atrial fibrillation (HCC)    Altered mental status    Cellulitis of lower extremity    Acute on chronic heart failure with preserved ejection fraction (HFpEF) (HCC)    Acute on chronic respiratory failure with hypoxia (HCC)    RBBB    OZ (obstructive sleep apnea)    Elevated troponin       Allergies:  No Known Allergies    Current Medications:  Current Facility-Administered Medications   Medication Dose Route Frequency Provider Last Rate Last Admin    cephALEXin (KEFLEX) capsule 250 mg  250 mg Oral 4 times per day Marylene Koller, APRN - NP   250 mg at 07/12/23 1440    miconazole (MICOTIN) 2 % powder   Topical BID Jay Gonzales MD   Given at 07/12/23 0857    furosemide (LASIX) injection 40 mg  40 mg IntraVENous BID CHITO Valdez CNP   40 mg at 07/12/23 0856    metoprolol succinate (TOPROL XL) extended release tablet 25 mg  25 mg Oral Daily CHITO Valdez CNP   25 mg at 07/12/23 0856    apixaban (ELIQUIS) tablet 5 mg  5 mg Oral BID CHITO Valdez CNP   5 mg at 07/12/23 0856    white motor deficits apparent, normal mood and affect    Intake/Output:    Intake/Output Summary (Last 24 hours) at 7/12/2023 1709  Last data filed at 7/12/2023 1440  Gross per 24 hour   Intake 600 ml   Output 3500 ml   Net -2900 ml     I/O this shift:  In: 600 [P.O.:600]  Out: 1600 [Urine:1600]    Laboratory Tests:  Recent Labs     07/10/23  1517 07/10/23  1803 07/11/23  1015 07/11/23  1710 07/12/23  0419 07/12/23  0618     --  138  --  139  --    K 5.2*  --  5.4* 4.9 5.4* 4.4   CL 99  --  98  --  97*  --    CO2 32*  --  35*  --  33*  --    BUN 21  --  18  --  22  --    CREATININE 0.9  --  0.8  --  0.9  --    GLUCOSE 128* 128 111*  --  165*  --    CALCIUM 9.1  --  9.0  --  8.7  --      No results found for: MG  Recent Labs     07/10/23  1517   ALKPHOS 99   ALT 10   AST 17   PROT 6.9   BILITOT 0.5   LABALBU 3.4*     Recent Labs     07/10/23  1517 07/11/23  1015 07/12/23  0419   WBC 5.5 6.8 6.9   RBC 4.56 4.23 4.18   HGB 10.8* 10.1* 9.9*   HCT 37.8 36.3* 34.9*   MCV 82.9 85.8 83.5   MCH 23.7* 23.9* 23.7*   MCHC 28.6* 27.8* 28.4*   RDW 18.5* 18.4* 18.4*    271 305   MPV 8.7 8.6 9.6     Lab Results   Component Value Date    CKTOTAL 124 10/18/2011    CKMB 1.2 10/18/2011    TROPONINI <0.01 10/18/2011     Recent Labs     07/10/23  1517 07/10/23  1729   TROPHS 32* 29*     Lab Results   Component Value Date    INR 1.1 10/18/2011    PROTIME 11.5 10/18/2011     Lab Results   Component Value Date    TSH 1.340 07/11/2023     Lab Results   Component Value Date    LABA1C 6.1 (H) 07/11/2023     No results found for: EAG  Lab Results   Component Value Date    CHOL 172 09/08/2022    CHOL 168 05/06/2022    CHOL 166 05/04/2021     Lab Results   Component Value Date    TRIG 41 09/08/2022    TRIG 48 05/06/2022    TRIG 47 05/04/2021     Lab Results   Component Value Date    HDL 56 09/08/2022    HDL 51 05/06/2022    HDL 54 05/04/2021     Lab Results   Component Value Date    LDLCALC 108 (H) 09/08/2022    LDLCALC 107 (H) 05/06/2022

## 2023-07-13 PROBLEM — K21.9 GASTROESOPHAGEAL REFLUX DISEASE: Status: ACTIVE | Noted: 2023-01-01

## 2023-07-13 PROBLEM — I50.33 ACUTE ON CHRONIC DIASTOLIC (CONGESTIVE) HEART FAILURE (HCC): Status: ACTIVE | Noted: 2023-01-01

## 2023-07-13 NOTE — PROGRESS NOTES
INPATIENT CARDIOLOGY FOLLOW-UP    Name: Jerri Venegas    Age: 78 y.o. Date of Admission: 7/10/2023  2:46 PM    Date of Service: 7/13/2023    Chief Complaint: Follow-up for acute HFpEF, atrial fibrillation    Interim History:  Currently with no chest pain or palpitations. +SOB. Rate controlled AF on EKG and telemetry. Reported I/O's net negative 6.1 L thus far.     Review of Systems:   Cardiac: As per HPI  General: No fever, chills  Pulmonary: As per HPI  HEENT: No visual disturbances, difficult swallowing  GI: No nausea, vomiting  : No dysuria, hematuria  Endocrine: No thyroid disease or DM  Musculoskeletal: AYON x 4, no focal motor deficits  Skin: Intact, no rashes  Neuro: No headache, seizures  Psych: Currently with no depression, anxiety    Problem List:  Patient Active Problem List   Diagnosis    Atrial fibrillation ([address])    New onset atrial fibrillation (HCC)    Altered mental status    Cellulitis of lower extremity    Acute on chronic heart failure with preserved ejection fraction (HFpEF) (HCC)    Acute on chronic respiratory failure with hypoxia (HCC)    RBBB    OZ (obstructive sleep apnea)    Elevated troponin       Allergies:  No Known Allergies    Current Medications:  Current Facility-Administered Medications   Medication Dose Route Frequency Provider Last Rate Last Admin    furosemide (LASIX) injection 20 mg  20 mg IntraVENous Once Brady Davis MD        [facility] capsule 250 mg  250 mg Oral 4 times per day CHITO Self NP   250 mg at 07/13/23 1202    miconazole (MICOTIN) 2 % powder   Topical BID Sirisha Lopez MD   Given at 07/13/23 0802    furosemide (LASIX) injection 40 mg  40 mg IntraVENous BID CHITO Elizalde CNP   40 mg at 07/13/23 1614    metoprolol succinate (TOPROL XL) extended release tablet 25 mg  25 mg Oral Daily CHITO Elizalde CNP   25 mg at 07/13/23 0802    apixaban (ELIQUIS) tablet 5 mg  5 mg Oral BID CHITO Elizalde CNP   5

## 2023-07-13 NOTE — PROGRESS NOTES
Physical Therapy  Facility/Department: [address] INTERNAL MEDICINE 2  Physical Therapy Initial Assessment    Name: Jerson Ruvalcaba  : 1940  MRN: [id number]  Date of Service: 2023      Patient Diagnosis(es): The primary encounter diagnosis was Altered mental status, unspecified altered mental status type. Diagnoses of New onset atrial fibrillation (HCC) and Cellulitis of lower extremity, unspecified laterality were also pertinent to this visit. 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 Past Surgical History:  has a past surgical history that includes Colonoscopy; polypectomy; hernia repair; ECHO Compl W Dop Color Flow (10/21/2011); ECHO Compl W Dop Color Flow (10/12/2012); and Wrist surgery. Evaluating Therapist: Dianne Nance PT    Room #:  9721/3498-A  Diagnosis:  Atrial fibrillation ([address]) [I48.91]  A-fib ([address]) [I48.91]  PMHx/PSHx:  spastic bladder  Precautions:  falls, O2, hard of hearing    Social:  Pt admitted from [facility]. Ambulates with ww. Initial Evaluation  Date: 23 Treatment      Short Term/ Long Term   Goals   Was pt agreeable to Eval/treatment? yes     Does pt have pain? No c/o pain     Bed Mobility  Rolling: max/dependent  Supine to sit: dependent to attempt, pt unable to fully sit up  Sit to supine: dependent  Scooting: dependent  Mod assist   Transfers   Unable due to pt unable to fully sit up to edge of bed  Max assist   Ambulation    NT  TBA   Stair Negotiation  Ascended and descended  NT   N/A   LE strength     2+/5    3+/5   balance      Dependent sitting     AM-PAC Raw score                        LE ROM: WFL  Edema: LE's  Endurance: poor       ASSESSMENT:    Pt displays functional ability as noted in the objective portion of this evaluation.       Patient education  Pt educated on Importance of mobility    Patient response to education:   Pt verbalized understanding Pt demonstrated skill Pt requires

## 2023-07-13 NOTE — PROGRESS NOTES
Palliative medicine notified of consult via secure text  Dr Brar spoke with RN regarding pulm consult  [facility]

## 2023-07-14 PROBLEM — Z71.89 GOALS OF CARE, COUNSELING/DISCUSSION: Status: ACTIVE | Noted: 2023-01-01

## 2023-07-14 NOTE — PROGRESS NOTES
SPEECH/LANGUAGE PATHOLOGY  CLINICAL ASSESSMENT OF SWALLOWING FUNCTION   and PLAN OF CARE    PATIENT NAME:  Adalberto Workman  (male)     MRN:  [id number]    :  1940  (78 y.o.)  STATUS:  Inpatient: Room 0412/0412-A    TODAY'S DATE:  2023  REFERRING PROVIDER:   23 Karen    SLP swallowing-dysphagia evaluation and treatment  Start:  23,   End:  23,   ONE TIME,   Standing Count:  1 Occurrences,   R         Ilir Newsome DO   REASON FOR REFERRAL: assess for dysphagia   EVALUATING THERAPIST: SIMONE Upton                 RESULTS:    DYSPHAGIA DIAGNOSIS:   Clinical indicators of pharyngeal phase dysphagia     Frequent clinical indicators of aspiration observed with liquids, however pt does have a cough at baseline. Family does report an increase in coughing with meals. Recommend a modified diet at this time with SLP to continue to assess. Pt may benefit from a video swallow evaluation to further assess, however due to radiology scheduling would not be able to be completed until Monday. DIET RECOMMENDATIONS:  Easy to chew consistency solids (IDDSI level 7, transitional) with honey consistency (moderately thick - IDDSI level 3)  liquids per spoon     SLP to continue to assess and modify diet as warranted. Attempted small sips per cup, however was not able to complete consistently.     MEDICATION ADMINISTRATION:  Administer medication whole, ONE AT A TIME, in pudding/applesauce     FEEDING RECOMMENDATIONS:     Assistance level:  Full assistance is needed during all oral intake due spoon sips of liquids needed      Compensatory strategies recommended: Small bites/sips and Liquids by teaspoon only      Discussed recommendations with nursing and/or faxed report to referring provider: Yes    SPEECH THERAPY  PLAN OF CARE   The dysphagia POC is established based on physician order, dysphagia diagnosis and results of clinical assessment     Skilled SLP intervention for dysphagia management on acute care up to 5 x per week until goals met, pt plateaus in function and/or discharged from hospital    Conditions Requiring Skilled Therapeutic Intervention for dysphagia:    Patient is performing below functional baseline d/t  current acute condition, respiratory compromise, multiple medications, and/or increased dependency upon caregivers. Specific dysphagia interventions to include:     compensatory swallowing strategies to improve airway protection and swallow function. ongoing mealtime assessment to provide diet modification and compensatory strategy implementation to minimize risk of aspiration associated with PO intake  PO trials of upgraded diet textures with SLP only to determine the least restrictive PO diet     Specific instructions for next treatment:  therapeutic po trial to determine safety of advanced diet textures and consistencies and initiate instruction of therapeutic exercises   Patient Treatment Goals:    Short Term Goals:  Pt will implement identified compensatory swallowing strategies on 90% of opportunities or greater to improve airway protection and swallow function. Pt will decrease clinical indicators of airway compromise to 2 or less during swallowing of 8 ounces of liquid  moderate verbal prompts    Long Term Goals:   Pt will maintain adequate nutrition/hydration via PO intake of the least restrictive oral diet with implementation of safe swallow/ compensatory strategies and decrease signs/symptoms of aspiration to less than 1 x/day. Pt will improve oropharyngeal swallow function to ensure airway protection during PO intake to maintain adequate nutrition/hydration and decrease signs/symptoms of aspiration to less than 1 x/day. Patient/family Goal:    Did not state. Will further assess during treatment.     Plan of care discussed with Patient and Family   The Family understand(s) the diagnosis, prognosis and plan of care     Rehabilitation Potential/Prognosis:

## 2023-07-14 NOTE — CONSULTS
Palliative Care Department  Palliative Care Initial Consult  Provider: Sarahy Marsh, CHITO - [facility]  [phone]    Hospital Day: 5  Date of Initial Consult: 7/13/23  Referring Provider: Dr. Luciano Richardson was consulted for assistance with: Code status Discussion, Assist with goals of care, and Family Support    Chief Complaint: Poornima Boogie is a 78 y.o. male with chief complaint of weakness, fatigue, AMS    HPI:   Poornima Boogie is a 78 y.o. male with significant medical history of asthma, COPD, and CHF, whom resides at an [facility] , who was admitted on 7/10/2023 after having worsening weakness, fatigue, and reports of AMS. He is being treated for LE cellulitis and also found to have new onset A-fib. He additionally had an episode concerning for aspiration, as well as further hypoxia. He was noted to have hypercapnia as well. He is being seen in consultation by cardiology and pulmonology. Palliative was consulted for goals of care and family support. He is currently a DNR-CC.     ASSESSMENT/PLAN:     Pertinent Hospital Diagnoses:  Current medical issues leading to Palliative Medicine involvement include   Active Hospital Problems    Diagnosis Date Noted    Gastroesophageal reflux disease [K21.9] 07/13/2023    Acute on chronic diastolic (congestive) heart failure (HCC) [I50.33] 07/13/2023    Altered mental status [R41.82] 07/11/2023    Cellulitis of lower extremity [L03.119] 07/11/2023    Acute on chronic heart failure with preserved ejection fraction (HFpEF) ([address]) [I50.33] 07/11/2023    Acute on chronic respiratory failure with hypoxia ([address]) [J96.21] 07/11/2023    RBBB [I45.10] 07/11/2023    OZ (obstructive sleep apnea) [G47.33] 07/11/2023    Elevated troponin [R77.8] 07/11/2023    Atrial fibrillation ([address]) [I48.91] 07/10/2023    New onset atrial fibrillation ([address]) [I48.91] 07/10/2023     Palliative Care Encounter / Counseling Regarding Goals of Care:  Please see detailed goals of care discussion as spent prior to the visit and after the visit in direct care of the patient. This time does not include time spent in any separately reportable services. CHITO Willard - CNP  Palliative Medicine    Patient and the plan of care discussed with the other IDT members of Palliative Care Team, and with consultants, Primary Attending, patient, family, and floor nurses, as appropriate and available. Thank you for allowing Palliative Medicine to participate in the care of Lucero Del Toro. Note: This report was completed using [employer] voiced recognition software. Every effort has been made to ensure accuracy; however, inadvertent computerized transcription errors may be present.

## 2023-07-14 NOTE — PROGRESS NOTES
Date: 7/14/2023    Time: 9:59 AM    Patient Placed On BIPAP/CPAP/ Non-Invasive Ventilation? No, pt remains on avaps    If no must comment. Facial area red/color change? No           If YES are Blister/Lesion present? No   If yes must notify nursing staff  BIPAP/CPAP skin barrier?   Yes    Skin barrier type:mepilexlite       Comments:        Génesis Grayson RCP

## 2023-07-14 NOTE — PROGRESS NOTES
INPATIENT CARDIOLOGY FOLLOW-UP    Name: eKvin Kamara    Age: 78 y.o. Date of Admission: 7/10/2023  2:46 PM    Date of Service: 7/14/2023    Chief Complaint: Follow-up for acute HFpEF, atrial fibrillation    Interim History:  Currently with no chest pain or palpitations. Currently on BiPAP. Rate controlled AF on EKG and telemetry. Reported I/O's net negative 7.5 L thus far.     Review of Systems:   Cardiac: As per HPI  General: No fever, chills  Pulmonary: As per HPI  HEENT: No visual disturbances, difficult swallowing  GI: No nausea, vomiting  : No dysuria, hematuria  Endocrine: No thyroid disease or DM  Musculoskeletal: AYON x 4, no focal motor deficits  Skin: Intact, no rashes  Neuro: No headache, seizures  Psych: Currently with no depression, anxiety    Problem List:  Patient Active Problem List   Diagnosis    Atrial fibrillation ([address])    New onset atrial fibrillation (HCC)    Altered mental status    Cellulitis of lower extremity    Acute on chronic heart failure with preserved ejection fraction (HFpEF) (HCC)    Acute on chronic respiratory failure with hypoxia (HCC)    RBBB    OZ (obstructive sleep apnea)    Elevated troponin    Gastroesophageal reflux disease    Acute on chronic diastolic (congestive) heart failure (HCC)       Allergies:  No Known Allergies    Current Medications:  Current Facility-Administered Medications   Medication Dose Route Frequency Provider Last Rate Last Admin    furosemide (LASIX) injection 20 mg  20 mg IntraVENous Once Kaitlynn Babcock MD        pantoprazole (PROTONIX) injection 40 mg  40 mg IntraVENous Daily Ilir Newsome DO   40 mg at 07/14/23 0737    cephALEXin (KEFLEX) capsule 250 mg  250 mg Oral 4 times per day CHITO Solano NP   250 mg at 07/13/23 1202    miconazole (MICOTIN) 2 % powder   Topical BID Trisha Lilly MD   Given at 07/14/23 0736    furosemide (LASIX) injection 40 mg  40 mg IntraVENous BID CHITO Zuñiga CNP   40 mg at 07/14/23 0742 JVP, no carotid bruits  Lungs: Decreased BS B/L, no wheezing  Cardiac: IRRR, no murmurs apparent  Abdomen: Soft, nontender, +bowel sounds  Extremities: Moves all extremities x 4, +lower extremity edema  Neurologic: No focal motor deficits apparent, normal mood and affect    Intake/Output:    Intake/Output Summary (Last 24 hours) at 7/14/2023 1208  Last data filed at 7/14/2023 0950  Gross per 24 hour   Intake --   Output 1300 ml   Net -1300 ml     I/O this shift:  In: -   Out: 1000 [Urine:1000]    Laboratory Tests:  Recent Labs     07/12/23  0419 07/12/23  0618 07/13/23  0508 07/14/23  0415     --  141 140   K 5.4* 4.4 4.4 4.3   CL 97*  --  96* 94*   CO2 33*  --  38* 39*   BUN 22  --  22 27*   CREATININE 0.9  --  0.8 0.8   GLUCOSE 165*  --  122* 111*   CALCIUM 8.7  --  9.3 9.1     No results found for: MG  No results for input(s): ALKPHOS, ALT, AST, PROT, BILITOT, BILIDIR, LABALBU in the last 72 hours. Recent Labs     07/12/23 0419 07/13/23  0508 07/14/23  0415   WBC 6.9 7.3 6.6   RBC 4.18 4.60 4.71   HGB 9.9* 10.8* 11.2*   HCT 34.9* 39.3 41.1   MCV 83.5 85.4 87.3   MCH 23.7* 23.5* 23.8*   MCHC 28.4* 27.5* 27.3*   RDW 18.4* 18.2* 18.2*    278 255   MPV 9.6 8.6 9.1     Lab Results   Component Value Date    CKTOTAL 124 10/18/2011    CKMB 1.2 10/18/2011    TROPONINI <0.01 10/18/2011     No results for input(s): CKTOTAL, CKMB, CKMBINDEX, TROPHS in the last 72 hours.     Lab Results   Component Value Date    INR 1.1 10/18/2011    PROTIME 11.5 10/18/2011     Lab Results   Component Value Date    TSH 1.340 07/11/2023     Lab Results   Component Value Date    LABA1C 6.1 (H) 07/11/2023     No results found for: EAG  Lab Results   Component Value Date    CHOL 172 09/08/2022    CHOL 168 05/06/2022    CHOL 166 05/04/2021     Lab Results   Component Value Date    TRIG 41 09/08/2022    TRIG 48 05/06/2022    TRIG 47 05/04/2021     Lab Results   Component Value Date    HDL 56 09/08/2022    HDL 51 05/06/2022    HDL 54

## 2023-07-14 NOTE — CONSULTS
Pulmonary Consultation    Admit Date: 7/10/2023    Requesting Physician: Mel King DO    CC:  respiratory failure     HPI:  Poornima Boogie 78 y.o. male lifelong non-smoker, former  with PMH of acute HFpEF, COPD/asthma, asbestoses, OZ on BiPAP 24/19, chronic hypoxic respiratory failure on 3L NC followed by Dr. Lynae Gowers who was admitted 7/10 from assisted living facility with altered mental status, weakness and fatigue. He was found to be in A-fib with unknown onset in which cardiology is following. Patient is also being treated for bilateral lower extremity cellulitis on Keflex. On 7/13, there was a possible aspiration event where he choked on food with increased FiO2 to 4L NC with saturations 84% with increased lethargy. ABG obtained on 5LNC, 7.252/99.4/82/42.8/92%. Patient placed on AVAPS 14/500/40/6 with repeat ABG 7.31/91.8/107/45.9/95%. Pulmonary was consulted for respiratory failure. Patient seen on AVAPS. He is alert to voice however is very hard of hearing. Unable to obtain any history from patient due to hearing disability therefore POA called, daughter-in-law who provided history as well as chart review. Daughter-in-law Harish Herrmannbri, stated patient sees Dr. Marisol Grewal for COPD/asthma on Symbicort and albuterol, OZ on BiPAP. Harish Glass states patient has been noncompliant with BiPAP x3 weeks with reports of not having supplies and she did note increased confusion over past week. Daughter-in-law will be coming in from [place] tomorrow and will bring hearing aid batteries in for the patient.     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 Patient placed on AVAPS 14/500/40/6 with repeat ABG 7.31/91.8/107/45.9/95%. Pulmonary was consulted for respiratory failure  7/14: AVAPs with ABG 7.38/76.8    Assessment/Plan  Acute on chronic hypercapnic hypoxic respiratory failure   Baseline O2 3L NC   Continue supplemental o2 to maintain pulse ox > 90-94%  AVAPS 14/500/40/6 ok to make PRN for naps and at HS now that he is compensated   Possible aspiration   Afebrile, no leukocytosis. Continue to monitor   OZ/OHS on BiPAP 24/19 with recent non-compliance  Follows Dr. Sukhwinder Angulo   11/5/2017 titration: 24/19  Asthma/COPD on Symbicort  Brovana and budesonide while inpatient   H/o asbestoses from working in the [employer] onset afib   On Eliquis 5 mg BID   Acute on chronic HFpEF: proBNP 1025  On IV lasix 40 mg BID per cardiology   DVT/PE prophylaxis on Eliquis       Thank you for allowing us to see this patient in consultation. Please contact us with any questions. Electronically signed by CHITO Mays CNP on 7/14/2023 at 11:52 AM     Seen and evaluated, agree with above assessment and plan  Acute on chronic respiratory failure  Agree with diuresis.   Noninvasive ventilation nightly and as needed  Wean FiO2 to keep saturation between 90-95%  Discussed with patient's son at bedside  Known to Dr Sukhwinder Angulo

## 2023-07-14 NOTE — PROGRESS NOTES
Speech Language Pathology      NAME:  Missy Humphrey  :  1940  DATE: 2023  ROOM:  [id number]         Order received. Chart reviewed. Attempted to complete a clinical swallow eval in AM.    Pt unavailable at this time due to:  [x] HOLD per RN due to decreased alertness and pt requiring continuous BiPAP at this time  [] Off unit for testing/ procedure    [] With medical staff   [] Declined intervention  [] Sleeping/ Lethargic   [] Other:       Will re-attempt as able. Thank you. Atrial fibrillation ([address]) [I48.91]  A-fib ([address]) [I48.91]            Kera KOHLI CCC/SLP [id number]  Speech-Language Pathologist

## 2023-07-15 NOTE — PROGRESS NOTES
INPATIENT CARDIOLOGY FOLLOW-UP    Name: Isreal Chris    Age: 78 y.o.     Date of Admission: 7/10/2023  2:46 PM    Date of Service: 7/15/2023    Chief Complaint: Follow-up for acute HFpEF, atrial fibrillation    Interim History:  No significant event overnight    Review of Systems:   Cardiac: As per HPI  General: No fever, chills  Pulmonary: As per HPI  HEENT: No visual disturbances, difficult swallowing  GI: No nausea, vomiting  : No dysuria, hematuria  Endocrine: No thyroid disease or DM  Musculoskeletal: AYON x 4, no focal motor deficits  Skin: Intact, no rashes  Neuro: No headache, seizures  Psych: Currently with no depression, anxiety    Problem List:  Patient Active Problem List   Diagnosis    Atrial fibrillation ([address])    New onset atrial fibrillation (HCC)    Altered mental status    Cellulitis of lower extremity    Acute on chronic heart failure with preserved ejection fraction (HFpEF) (HCC)    Acute on chronic respiratory failure with hypoxia (HCC)    RBBB    OZ (obstructive sleep apnea)    Elevated troponin    Gastroesophageal reflux disease    Acute on chronic diastolic (congestive) heart failure (HCC)    Goals of care, counseling/discussion       Allergies:  No Known Allergies    Current Medications:  Current Facility-Administered Medications   Medication Dose Route Frequency Provider Last Rate Last Admin    arformoterol tartrate (BROVANA) nebulizer solution 15 mcg  15 mcg Nebulization BID Schultz Savin, APRN - CNP   15 mcg at 07/15/23 0945    budesonide (PULMICORT) nebulizer suspension 500 mcg  0.5 mg Nebulization BID Schultz Savin, APRN - CNP   500 mcg at 07/15/23 0945    albuterol (PROVENTIL) (2.5 MG/3ML) 0.083% nebulizer solution 2.5 mg  2.5 mg Nebulization Q6H PRN Schultz Savin, APRN - CNP        furosemide (LASIX) injection 20 mg  20 mg IntraVENous Once Marilin Velásquez MD        pantoprazole (PROTONIX) injection 40 mg  40 mg IntraVENous Daily Ilir Newsome DO   40 mg at 07/15/23 0938    cephALEXin

## 2023-07-15 NOTE — PROGRESS NOTES
[facility] Progress Note    Admitting Date and Time: 7/10/2023  2:46 PM  Admit Dx: Atrial fibrillation ([address]) [I48.91]  A-fib ([address]) [I48.91]      Assessment:    Principal Problem:    Atrial fibrillation ([address])  Active Problems:    New onset atrial fibrillation (HCC)    Altered mental status    Cellulitis of lower extremity    Acute on chronic heart failure with preserved ejection fraction (HFpEF) (HCC)    Acute on chronic respiratory failure with hypoxia (HCC)    RBBB    OZ (obstructive sleep apnea)    Elevated troponin    Gastroesophageal reflux disease    Acute on chronic diastolic (congestive) heart failure (HCC)    Goals of care, counseling/discussion  Resolved Problems:    * No resolved hospital problems. *      Plan:  1. Atrial fib  - unknown duration. V-rates are controlled  Started lopressor and Eliquis this admission    2. Acute on chronic HFpEF  - ECHO shows LVEF 55%, dilated right ventricle. PA pressure 49 mmHg    Continue medical management metoprolol succinate and IV Lasix  Negative 9 liters since admission    3. Acute on chronic respiratory failure with hypoxia/hypercapnia  - Baseline is 3 LPM NC, has required increased flow rates  Respiratory distress and tachypnea noted here. ABG showing hypercapnia  Report of not using NIV at his assisted living facilty    Continue BiPAP/AVAPS  Wean supplemental O2 back to baseline  Treating above CHF    Possible aspiration, monitor for pleuritic pain/fever increased O2. Monitor off antibiotics. 4. COPED  - continue nebs, Brovana/Pulmicort    5. GERD  - PPI    6. Cellulitis  - came in on Keflex, continue same and monitor response    VTE prophylaxis: Eliquis    Subjective:  Patient is being followed for Atrial fibrillation ([address]) [I48.91]  A-fib ([address]) [I48.91]     Multiple family members at bedside, states he looks much better and is mentating closer to normal.    Patient denies feeling SOB, denies chest pain.    No fever or chills

## 2023-07-15 NOTE — PROGRESS NOTES
Date: 7/14/2023    Time: 11:48 PM    Patient Placed On BIPAP/CPAP/ Non-Invasive Ventilation? Pt remains on    If no must comment. Facial area red/color change? No           If YES are Blister/Lesion present? No   If yes must notify nursing staff  BIPAP/CPAP skin barrier? Yes    Skin barrier type:mepilexlite       Comments: Machine plugged into red outlet and outside alarm plugged in.         [facility]    07/14/23 5139   NIV Type   NIV Started/Stopped On   Mode AVAPS   Mask Type Full face mask   Mask Size Large   Assessment   Respirations 17   Comfort Level Good   Using Accessory Muscles No   Mask Compliance Good   Skin Assessment Clean, dry, & intact   Skin Protection for O2 Device Yes   Settings/Measurements   PIP Observed 26 cm H20   CPAP/EPAP 6 cmH2O   IPAP Min 20 cmH2O   IPAP Max 25 cmH2O   Vt (Set, mL) 500 mL   Vt (Measured) 557 mL   Rate Ordered 14   FiO2  40 %   Minute Volume (L/min) 9.1 Liters   Mask Leak (lpm) 20 lpm

## 2023-07-16 NOTE — PROGRESS NOTES
[facility] Progress Note    Admitting Date and Time: 7/10/2023  2:46 PM  Admit Dx: Atrial fibrillation ([address]) [I48.91]  A-fib ([address]) [I48.91]      Assessment:    Principal Problem:    Atrial fibrillation ([address])  Active Problems:    New onset atrial fibrillation (HCC)    Altered mental status    Cellulitis of lower extremity    Acute on chronic heart failure with preserved ejection fraction (HFpEF) (HCC)    Acute on chronic respiratory failure with hypoxia (HCC)    RBBB    OZ (obstructive sleep apnea)    Elevated troponin    Gastroesophageal reflux disease    Acute on chronic diastolic (congestive) heart failure (HCC)    Goals of care, counseling/discussion  Resolved Problems:    * No resolved hospital problems. *      Plan:  1. Atrial fib  - unknown duration. V-rates are controlled  Started lopressor and Eliquis this admission    Considering cardioversion pending diuresis and volume status    2. Acute on chronic HFpEF  - ECHO shows LVEF 55%, dilated right ventricle. PA pressure 49 mmHg    Continue medical management metoprolol succinate and IV Lasix  Negative > 10 liters since admission    3. Acute on chronic respiratory failure with hypoxia/hypercapnia  - Baseline is 3 LPM NC, has required increased flow rates  Respiratory distress and tachypnea noted here. ABG showing hypercapnia  Report of not using NIV at his assisted living facilty    Continue BiPAP/AVAPS  Wean supplemental O2 back to baseline  Treating above CHF    Possible aspiration, monitor for pleuritic pain/fever increased O2. Monitor off antibiotics. 4. COPED  - continue nebs, Brovana/Pulmicort    5. GERD  - PPI    6. Cellulitis  - continues on keflex      VTE prophylaxis: Eliquis    Subjective:  Patient is being followed for Atrial fibrillation ([address]) [I48.91]  A-fib ([address]) [I48.91]     He had a massive bowel movement per nursing report, feels abdomen is less distended.  Denies feeling SOB today and no chest pressure or feeling of

## 2023-07-16 NOTE — PLAN OF CARE
Problem: Discharge Planning  Goal: Discharge to home or other facility with appropriate resources  Outcome: Progressing     Problem: Safety - Adult  Goal: Free from fall injury  Outcome: Progressing     Problem: Skin/Tissue Integrity  Goal: Absence of new skin breakdown  Description: 1. Monitor for areas of redness and/or skin breakdown  2. Assess vascular access sites hourly  3. Every 4-6 hours minimum:  Change oxygen saturation probe site  4. Every 4-6 hours:  If on nasal continuous positive airway pressure, respiratory therapy assess nares and determine need for appliance change or resting period.   Outcome: Progressing     Problem: Pain  Goal: Verbalizes/displays adequate comfort level or baseline comfort level  Outcome: Progressing     Problem: Chronic Conditions and Co-morbidities  Goal: Patient's chronic conditions and co-morbidity symptoms are monitored and maintained or improved  Outcome: Progressing

## 2023-07-16 NOTE — PROGRESS NOTES
PS messaged regarding administration of IV fluids ordered, and IV lasix for clarification. Per Dr. Martínez Coins hold IV lasix, and give NS @100ml per hour for 1L. Alvin Ansari also notified family would like to speak with or be given an idea of the plan of care via pulm standpoint.

## 2023-07-16 NOTE — PROGRESS NOTES
INPATIENT CARDIOLOGY FOLLOW-UP    Name: Salome Rivas    Age: 78 y.o.     Date of Admission: 7/10/2023  2:46 PM    Date of Service: 7/16/2023    Chief Complaint: Follow-up for acute HFpEF, atrial fibrillation    Interim History:  Apparently had bloody bowel movement      Problem List:  Patient Active Problem List   Diagnosis    Atrial fibrillation ([address])    New onset atrial fibrillation (HCC)    Altered mental status    Cellulitis of lower extremity    Acute on chronic heart failure with preserved ejection fraction (HFpEF) (HCC)    Acute on chronic respiratory failure with hypoxia (HCC)    RBBB    OZ (obstructive sleep apnea)    Elevated troponin    Gastroesophageal reflux disease    Acute on chronic diastolic (congestive) heart failure (HCC)    Goals of care, counseling/discussion       Allergies:  No Known Allergies    Current Medications:  Current Facility-Administered Medications   Medication Dose Route Frequency Provider Last Rate Last Admin    ascorbic acid (VITAMIN C) tablet 500 mg  500 mg Oral Daily Sae Mohr MD        trospium (SANCTURA) tablet 20 mg  20 mg Oral BID AC Sae Mohr MD        meloxicam (MOBIC) tablet 7.5 mg  7.5 mg Oral Daily aSe Mohr MD        0.9 % sodium chloride infusion   IntraVENous Continuous CHITO Casanova CNP        albuterol (PROVENTIL) (2.5 MG/3ML) 0.083% nebulizer solution 2.5 mg  2.5 mg Nebulization Q6H PRN Sae Mohr MD        arformoterol tartrate (BROVANA) nebulizer solution 15 mcg  15 mcg Nebulization BID CHITO Pablo CNP   15 mcg at 07/16/23 0912    budesonide (PULMICORT) nebulizer suspension 500 mcg  0.5 mg Nebulization BID CHITO Pablo CNP   500 mcg at 07/16/23 0912    albuterol (PROVENTIL) (2.5 MG/3ML) 0.083% nebulizer solution 2.5 mg  2.5 mg Nebulization Q6H PRN CHITO Pablo CNP   2.5 mg at 07/16/23 1343    furosemide (LASIX) injection 20 mg  20 mg IntraVENous Once Violet Guillen MD        pantoprazole ([facility])

## 2023-07-16 NOTE — PROGRESS NOTES
distress. AVAPS. Resighini  Eyes: No sclera icterus. No conjunctival injection. ENT: No discharge. Neck: Trachea midline  Resp: No accessory muscle use. No crackles. No wheezing. Rhonchi that clears with cough   CV: Regular rate. Regular rhythm. No mumur or rub. ABD: Non-tender. Non-distended. Normal bowel sounds. Skin: Warm and dry. No nodule on exposed extremities. No rash on exposed extremities. Red ty BLE  Ext: No joint deformity. No clubbing. No cyanosis. +3 BLE non pitting,  Neuro: Awake. Follows commands. A&Ox3    I/O: I/O last 3 completed shifts:  In: -   Out: 900 [Urine:900]  No intake/output data recorded. Results:  CBC:   Recent Labs     07/14/23  0415   WBC 6.6   HGB 11.2*   HCT 41.1   MCV 87.3        BMP:   Recent Labs     07/14/23  0415      K 4.3   CL 94*   CO2 39*   BUN 27*   CREATININE 0.8         ABG:   Recent Labs     07/14/23  1205   PH 7.387   PO2 80.3   PCO2 76.8*   HCO3 45.1*   BE 16.6*   O2SAT 95.8       Films:  CT HEAD WO CONTRAST  Result Date: 7/10/2023 No acute intracranial abnormality. CT ABDOMEN PELVIS W IV CONTRAST Result Date: 7/10/2023  Colonic fecal retention otherwise no definitive findings to explain the patient's symptoms. Cholelithiasis. XR CHEST PORTABLE 7/10/2023  Suspect early/mild CHF. Superimposed pneumonia cannot be excluded. XR CHEST PORTABLE 7/12/2023  Increased interstitial prominence with cardiomegaly concerning for  worsening congestion and or fluid overload  Slightly increased bibasilar atelectasis  No signs of pneumothorax     XR CHEST PORTABLE 7/14/2023  Bilateral interstitial and ground-glass opacities suggestive of edema or  infection in the acute setting. Low lung volumes and elevation of the right hemidiaphragm. Cardiomegaly. Trace bilateral pleural effusions/thickening. 7/12 7/14 7/11/2023 Echo: Normal left ventricular systolic function.  Ejection fraction is visually estimated at > 55%. Concentric left ventricular hypertrophy. Dilated right ventricle / TAPSE 1.8 cm. Indeterminate diastolic function. Severely dilated left atrium by volume index. Dilated right atrium. Mild tricuspid regurgitation. PASP is estimated at 49 mmHg    Summary of Events:   78 y.o. male lifelong non-smoker, former  with PMH of acute HFpEF,   COPD/asthma, asbestosis, OZ on BiPAP 24/19, chronic hypoxic respiratory failure on 3L NC followed by Dr. Yaima Jauregui who   7/10 was admitted from assisted living facility with altered mental status, weakness and fatigue. He was found to be in A-fib with unknown onset in which cardiology is following. Patient is also being treated for bilateral lower extremity cellulitis on Keflex. 7/13, there was a possible aspiration event where he choked on food with increased FiO2 to 4L NC with saturations 84% with increased lethargy. ABG obtained on 5LNC, 7.252/99.4/82/42.8/92%. Patient placed on AVAPS 14/500/40/6 with repeat ABG 7.31/91.8/107/45.9/95%. Pulmonary was consulted for respiratory failure  7/14: AVAPs with ABG 7.38/76.8  7/15: 4L, 97%/AVAPs  7/16: 4L, 96%. AVAPS overnight     Assessment/Plan:  Acute on chronic hypercapnic hypoxic respiratory failure   4L NC 96%  Baseline O2 3L NC. Please attempt to wean to baseline oxygen   Continue supplemental o2 to maintain pulse ox > 90-94%  AVAPS 14/500/40/6 PRN and at HS  Possible aspiration   Remains afebrile, no leukocytosis.  Continue to monitor   Dysphagia  Speech following  Severe OZ/OHS on BiPAP 24/19 with recent non-compliance due to discomfort R upper tooth   Follows Dr. Yaima Jauregui   11/5/2017 titration: 24/19  Asthma/COPD on Symbicort  Brovana and budesonide while inpatient   H/o asbestoses from working in the [employer] onset afib   On Eliquis 5 mg BID   Plans for ANAHI/DCCV once volume status improves   Acute on chronic HFpEF: proBNP 1025  On IV lasix 40 mg BID per cardiology, -10L since

## 2023-07-16 NOTE — PROGRESS NOTES
Date: 7/15/2023    Time: 10:46 PM    Patient Placed On BIPAP/CPAP/ Non-Invasive Ventilation? Yes    If no must comment. Facial area red/color change? No           If YES are Blister/Lesion present? No   If yes must notify nursing staff  BIPAP/CPAP skin barrier?   Yes    Skin barrier type:mepilexlite       Comments:        Meche Piña, [facility]    07/15/23 4209   NIV Type   NIV Started/Stopped On   Equipment Type V60   Mode AVAPS   Mask Type Full face mask   Mask Size Large   Settings/Measurements   PIP Observed 23 cm H20   CPAP/EPAP 6 cmH2O   IPAP Min 20 cmH2O   IPAP Max 25 cmH2O   Vt (Set, mL) 500 mL   Vt (Measured) 673 mL   Rate Ordered 14   Insp Rise Time (%) 2 %   FiO2  40 %   I Time/ I Time % 0.8 s   Minute Volume (L/min) 15.4 Liters   Mask Leak (lpm) 39 lpm   Patient's Home Machine No   Alarm Settings   Alarms On Y

## 2023-07-16 NOTE — PROGRESS NOTES
Dr. Araceli Boggs notified of patient's large bowel mov't mixed with urine appeared to have blood and patient refusing Miralax. No orders at this time.

## 2023-07-17 NOTE — PROGRESS NOTES
HOSPICE [facility]    Consult received. Chart reviewed. No family at bedside. Patient sitting in bed eating lunch. Called daughter Delicia Mays. The hospice benefit and philosophy were explained including that hospice is end of life care in which, per Medicare, a patient has a terminal diagnosis that life expectancy would be 6 months or less. Explained that once in hospice care, all aggressive treatments would be stopped and allow nature to takes its course with focus on comfort care for the patient. Explained hospice services at home, at [facility] with room/board private pay unless patient has Medicaid and the [facility] for short-term symptom management and respite. Delicia Mays concerned about her father not being able to breath without the trilogy machine. Bedside nurse said he is only wearing the Trilogy at night. Patient does have a c-pap at the facility that he can continue to wear at night and with naps. Explained that if patient started to have more difficulty breathing that hospice would medicated for symptom management. Delicia Mays would like to speak with  for assistance to find a SNF for her father if he is unable to return to [address]. Updated bedside nurse, charge nurse and CM.      Electronically signed by Teo Peters RN on 7/17/2023 at 1:08 PM  [phone]; [phone]

## 2023-07-17 NOTE — CARE COORDINATION
Bedside follow up. Met with patient's son Ignacio. Phone conversation with daughter Ashvin Whitfield as well  He is aware of today's events namely Pall med consult and Hospice consult. Family still processing and contemplating options. Call was placed to [facility], await return call from director [phone] regarding capacity to receive patient back with current needs w/wo Hospice. [facility] and [facility] SNF lists left at bedside for son to review with family in the event facility placement would be necessary to pursue. Lists also sent via email to daughter  Ashvin Whitfield. Saritha Cross with [address] [facility] investigating capacity to provide AVAPS post discharge if patient is active with Hospice. Will follow along with  and assist with discharge planning as necessary. Amarilis Tomlin.  Kathryn, MSN RN  [facility] Case Management  [phone]

## 2023-07-17 NOTE — PROGRESS NOTES
Nutrition Assessment    Type and Reason for Visit:  Initial, RD Nutrition Re-Screen/LOS    Nutrition Recommendations/Plan:   Continue current diet, as per SLP recommendation  Continue ONS, as tolerated       Nutrition Assessment:    Pt admit 2/2 A-fib and AMS. He has significant medical history of asthma, COPD, and CHF, whom resides at an [facility]. Palliative care has been following and family wish code DNR-CC. They also would like a Hospice Consult. Pt has had increase in coughing when eating, so SLP consulted and following pt. Will add Magic Cup ONS BID and monitor POC. Nutrition Related Findings:    distended, firm, obese abd +BS, very [place], trace-+2 edema, -I/O 10L, dysphagia Wound Type: Deep Tissue Injury (inner buttock per wound care)       Current Nutrition Intake & Therapies:    Average Meal Intake: %  Average Supplements Intake: None Ordered  ADULT DIET; Easy to Chew; Moderately Thick (Honey)    Anthropometric Measures:  Height: 5' 7\" (170.2 cm)  Ideal Body Weight (IBW): 148 lbs (67 kg)    Admission Body Weight: 345 lb 12.8 oz (156.9 kg) (7/11 bedscale)  Current Body Weight: 324 lb 5 oz (147.1 kg), 219.1 % IBW.  Weight Source: Bed Scale (7/16)  Current BMI (kg/m2): 50.8  Usual Body Weight:  (FROY - no hx actual wt <12 mo)                       BMI Categories: Obese Class 3 (BMI 40.0 or greater)        Nutrition Interventions:   Food and/or Nutrient Delivery: Continue Current Diet, Start Oral Nutrition Supplement  Nutrition Education/Counseling: Education not indicated  Coordination of Nutrition Care: Continue to monitor while inpatient       Goals:     Goals: PO intake 75% or greater, by next RD assessment       Nutrition Monitoring and Evaluation:   Behavioral-Environmental Outcomes: None Identified  Food/Nutrient Intake Outcomes: Food and Nutrient Intake, Supplement Intake  Physical Signs/Symptoms Outcomes: Biochemical Data, GI Status, Fluid Status or Edema, Nutrition Focused Physical Findings, Skin,

## 2023-07-17 NOTE — PROGRESS NOTES
Palliative Care Department  Palliative Care Progress Note  Provider: Kathy HenryCHITO CNP  [phone]    Hospital Day: 8  Date of Initial Consult: 7/13/23  Referring Provider: Dr. Silverio Guallpa was consulted for assistance with: Code status Discussion, Assist with goals of care, and Family Support    Chief Complaint: Dayan Adams is a 78 y.o. male with chief complaint of weakness, fatigue, AMS    HPI:   Dayan Adams is a 78 y.o. male with significant medical history of asthma, COPD, and CHF, whom resides at an [facility] , who was admitted on 7/10/2023 after having worsening weakness, fatigue, and reports of AMS. He is being treated for LE cellulitis and also found to have new onset A-fib. He additionally had an episode concerning for aspiration, as well as further hypoxia. He was noted to have hypercapnia as well. He is being seen in consultation by cardiology and pulmonology. Palliative was consulted for goals of care and family support. He is currently a DNR-CC.     ASSESSMENT/PLAN:     Pertinent Hospital Diagnoses:  Current medical issues leading to Palliative Medicine involvement include   Active Hospital Problems    Diagnosis Date Noted    Goals of care, counseling/discussion [Z71.89] 07/14/2023    Gastroesophageal reflux disease [K21.9] 07/13/2023    Acute on chronic diastolic (congestive) heart failure (HCC) [I50.33] 07/13/2023    Altered mental status [R41.82] 07/11/2023    Cellulitis of lower extremity [L03.119] 07/11/2023    Acute on chronic heart failure with preserved ejection fraction (HFpEF) ([address]) [I50.33] 07/11/2023    Acute on chronic respiratory failure with hypoxia (HCC) [J96.21] 07/11/2023    RBBB [I45.10] 07/11/2023    OZ (obstructive sleep apnea) [G47.33] 07/11/2023    Elevated troponin [R77.8] 07/11/2023    Atrial fibrillation ([address]) [I48.91] 07/10/2023    New onset atrial fibrillation ([address]) [I48.91] 07/10/2023     Palliative Care Encounter / Counseling Regarding Goals of minutes, which was spent performing a face-to-face encounter and personally completing the provider-level activities documented in the note. This includes time spent prior to the visit and after the visit in direct care of the patient. This time does not include time spent in any separately reportable services. CHITO Arellano - CNP  Palliative Medicine    Patient and the plan of care discussed with the other IDT members of Palliative Care Team, and with consultants, Primary Attending, patient, family, and floor nurses, as appropriate and available. Thank you for allowing Palliative Medicine to participate in the care of Salome Rivas. Note: This report was completed using computerEngrade voiced recognition software. Every effort has been made to ensure accuracy; however, inadvertent computerized transcription errors may be present.

## 2023-07-17 NOTE — PROGRESS NOTES
Date: 7/16/2023    Time: 10:44 PM    Patient Placed On BIPAP/CPAP/ Non-Invasive Ventilation? Yes    If no must comment. Facial area red/color change? No           If YES are Blister/Lesion present? No   If yes must notify nursing staff  BIPAP/CPAP skin barrier?   Yes    Skin barrier type:mepilexlite       Comments:        Edu Rizzo, [facility]    07/16/23 7120   NIV Type   NIV Started/Stopped On   Equipment Type V60   Mode AVAPS   Mask Type Full face mask   Mask Size Large   Settings/Measurements   PIP Observed 20 cm H20   CPAP/EPAP 6 cmH2O   IPAP Min 20 cmH2O   IPAP Max 25 cmH2O   Vt (Set, mL) 500 mL   Vt (Measured) 596 mL   Rate Ordered 14   Insp Rise Time (%) 2 %   FiO2  40 %   I Time/ I Time % 0.8 s   Minute Volume (L/min) 10.2 Liters   Mask Leak (lpm) 19 lpm   Patient's Home Machine No   Alarm Settings   Alarms On Y

## 2023-07-18 NOTE — PROGRESS NOTES
HOSPICE [facility]    Follow-up made to bedside. Demetria Lackey requested ice water. Confirmed with nurse Gretta Wiley patient is honey thick liquids. Provided a cup of honey thickened water to Demetria Lackey. Head of the bed set up and this nurse helped Demetria Lackey take two drinks. Called daughter Carvel Pack. Explained CPAP can be continued at night with hospice but we would not order a trilogy. Carvel Pack understanding. All further questions answered. Carvel Pack is awaiting the response from the AL. She is discouraged because the AL told them upon touring the facility he could be there through the end. If he is unable to return ECF choices will be given from family. Plan is AL or ECF with hospice. Nursing and case management updated. Hospice will continue to follow.     Electronically signed by Jamal Ramirez RN on 7/18/2023 at 10:44 AM   [phone]

## 2023-07-18 NOTE — CARE COORDINATION
Spoke with Lexis at [employer] [facility] and  are reviewing clinical info and will communicate if patient can return with Hospice services. Await response. Bee Latham.  ROSY Peralta RN  [facility] Case Management  [phone]

## 2023-07-18 NOTE — PROGRESS NOTES
independence with ADLs  * Therapeutic activities to facilitate gross/fine motor skills for increased independence with ADLs  * Neuro-muscular re-education: facilitation of righting/equilibrium reactions, midline orientation, scapular stability/mobility, normalization of muscle tone, and facilitation of volitional active controled movement     Recommended Adaptive Equipment: continue to assess      Home Living: Patient reports he is from assisted living facility, no steps  Bathroom Setup: walk in shower with shower bench  Equipment Owned: FWW     Prior Level of Function (PLOF): Patient reports he is independent with UB dressing, bathing and grooming (completes grooming standing at sink). Patient reports staff assists with lower body dressing and with showers. Patient reports he takes himself to/from the bathroom with use of FWW and only requires assistance for hygiene following a bowel movement. Patient reports he walks to/from the dining room 3 x a day with his FWW and wears O2 \"most of the time\". Patient sleeps in a lift chair     Pain Level: Pt did not report level of pain. Cognition: Pt awake and alert. Limited direction following. Cognition vs Kaw? Functional Assessment:                  AM-PAC Daily Activity Raw Score: 11/24    Initial Eval Status  Date: 7/11/2023 Treatment Status  Date: 7/18/23  Short Term Goals = Long Term Goals   Feeding setup   independent   Grooming MIN A, bed level Mod A   Mod I   UB Dressing MOD A Max A   Mod I    LB Dressing Total assistance Dependent   MOD A  - with use of AE, as needed/appropriate   Bathing MAX A   MOD A - with use of AE/DME, as needed/appropriate   Toileting Total assistance   MOD A   Bed Mobility  Supine-to-Sit: total assistance to advance BLE to edge of bed and to bring trunk to midline position  Sit-to-Supine: total assistance to lift BLE into bed   Rolling: MAX A  dependent supine to sit onto the side of the bed. Unable to achieve upright sitting.   See comments. MOD A in order to maximize patient's independence/participation with ADLs and other functional tasks. Functional Transfers Sit-to-Stand: unable to complete. Patient required MIN A for seated balance EOB and reported significant dizziness, unable to stand   MOD A   Functional Mobility Unable to complete due to dizziness in sitting   MOD A with functional mobility (with device, as needed/appropriate) in order to maximize independence with ADLs and other functional tasks. Balance Sitting: MIN A (EOB)  Standing: unable   MOD A dynamic standing balance during completion of ADLs and other functional tasks. Activity Tolerance Limited, SOB for tasks with reported dizziness upon standing   Patient will demonstrate Good understanding and consistent implementation of energy conservation techniques and work simplification techniques into ADLs and functional tasks   Visual/  Perceptual WFL, glasses      N/A   B UE Strength 4-/5 Limited use of UE's for support during activity. Patient will demonstrate 4+/5 B UE strength in order to maximize independence with ADLs and functional transfers. Comments:  Pt agreeable to therapy. Wanted to try to sit on the side of the bed. Dependent to transition to sitting however unable to achieve full upright sitting. Pt on specialty bed with air mattress. EZ glide under pt. Surface of mattress and EZ glide causing pt to slide forward with attempts to sit on the EOB. Hips getting close to the edge and pt rigid and poor trunk flexion causing him to slide further forward. Due to level of assist and size of this pt, it was unsafe to proceed with one therapist available in the room. Assisted pt back to bed and nursing called to help reposition up higher into the bed. Education/treatment:  ADL retraining with facilitation of movement to increase self care skills. Therapeutic activity to address balance and endurance for ADL and transfers.   Pt education of daily orientation. Pt has made limited  progress towards set goals.        Time In: 1:25  Time Out: 1:40      Min Units   Therapeutic Ex 46635     Therapeutic Activities [id number] 1   ADL/Self Care [id number] 5    Orthotic Management [id number]     Neuro Re-Ed 62482     Non-Billable Time     TOTAL TIMED TREATMENT 15 [address] [facility] SHAILESH/L 46921

## 2023-07-19 NOTE — CARE COORDINATION
After nursing facility choice list was reviewed with patient's daughter Nahid Boateng, she voiced selection of [facility] on [employer]. Referral was called to Shahida Napier with same. Will await her review and response regarding bed availability/acceptance. Terri with Hospice continues to follow. Meagan Palafox.  Kathryn, MSN RN  [facility] Case Management  [phone]

## 2023-07-19 NOTE — PROGRESS NOTES
HOSPICE OF [facility]    Placed call to daughter Rayshawn Fitzpatrick to follow-up. Informed her patient's AL stated they could not take him back. She did provide case management of an ECF with hospice choice. Encouraged her to look at list for other choices as well in case her first choice does not have bed availability. Rayshawn Fitzpatrick thankful for update. Will continue to follow and complete hospice piece once facility is determined. Electronically signed by Donald Casey RN on 7/19/2023 at 9:12 AM   [phone] - requested this nurse come see patient. Bailee Aden had increased abdominal pain yesterday according to bedside nurse. He was given 2mg of morphine and then 4mg of morphine. He was also given lactulose as they felt patient may be constipated. Per chart he had a BM over night and has had BM's over the last couple days. On 7.10.23 abdominal CT did show stool. Bailee Aden slept thought the night and woke up without pain. He ate breakfast. Shortly after eating be complained of abdominal pain again and was given another dose of morphine 4mg. If constipation is the issue he will need a scheduled bowel regime especially if opiates are being given. Upon assessment Bailee Aden was resting and unable to be effectively woken up to assess effectiveness of morphine. Hospice will follow-up later today.    Electronically signed by Donald Casey RN on 7/19/2023 at 11:25 AM

## 2023-07-19 NOTE — PROGRESS NOTES
Patient transported to the [address], [address] [place] [address] notified patient in [facility].   Ifrah Collado RN

## 2023-07-19 NOTE — PROGRESS NOTES
SPEECH LANGUAGE PATHOLOGY  DAILY PROGRESS NOTE        PATIENT NAME:  Virginie Curiel      :  1940          TODAY'S DATE:  2023 ROOM:  [address] [place] [address]    INTERVENTION  Patient seen as a follow-up for dysphagia management. Speech Pathologist (SLP) student completed re-education with the patient regarding type of swallowing impairment. Reviewed current solid/liquid consistency diet recommendations and discussed compensatory strategies to ensure safe PO intake (small sips/bites, liquids by spoon). Reviewed aspiration precautions. Encouraged patient to engage SLP student in unstructured Q&A session relative to identified deficit areas; indicated understanding of all information provided via satisfactory verbal response. Patient was seated in an upright position in bed upon SLP student entry. Patient was given multiple trials of moderately thick liquid via spoon. No overt signs or symptoms of aspiration were observed. Patient was given multiple single sips of moderately thick liquid via cup, fed by SLP student. No overt signs or symptoms of aspiration were observed. Patient reported no difficulty with swallowing during mealtime. Oral Phase: No oral containment or bolus formation issues observed this date. Pharyngeal: Patient maintained a clear vocal quality throughout this date. Will continue to monitor. SWALLOWING:     DYSPHAGIA DIAGNOSIS:   Clinical indicators of pharyngeal phase dysphagia      Frequent clinical indicators of aspiration observed with liquids, however pt does have a cough at baseline. Family does report an increase in coughing with meals. Recommend a modified diet at this time with SLP to continue to assess. Pt may benefit from a video swallow evaluation to further assess, however due to radiology scheduling would not be able to be completed until Monday.                            DIET RECOMMENDATIONS:  Easy to chew consistency solids (IDDSI level 7, transitional) with honey

## 2023-07-19 NOTE — DISCHARGE SUMMARY
[facility] Physician Discharge Summary       No follow-up provider specified. Activity level: As tolerated     Dispo:    Condition on discharge: Stable     Patient ID:  Adalberto Workman  [id number]  78 y.o.  1940    Admit date: 7/10/2023    Discharge date and time:  2023  12:37 PM    Admission Diagnoses: Principal Problem:    Atrial fibrillation ([address])  Active Problems:    New onset atrial fibrillation (HCC)    Altered mental status    Cellulitis of lower extremity    Acute on chronic heart failure with preserved ejection fraction (HFpEF) (HCC)    Acute on chronic respiratory failure with hypoxia (HCC)    RBBB    OZ (obstructive sleep apnea)    Elevated troponin    Gastroesophageal reflux disease    Acute on chronic diastolic (congestive) heart failure (HCC)    Goals of care, counseling/discussion  Resolved Problems:    * No resolved hospital problems. *      Discharge Diagnoses: Principal Problem:    Atrial fibrillation ([address])  Active Problems:    New onset atrial fibrillation (HCC)    Altered mental status    Cellulitis of lower extremity    Acute on chronic heart failure with preserved ejection fraction (HFpEF) (HCC)    Acute on chronic respiratory failure with hypoxia (HCC)    RBBB    OZ (obstructive sleep apnea)    Elevated troponin    Gastroesophageal reflux disease    Acute on chronic diastolic (congestive) heart failure (HCC)    Goals of care, counseling/discussion  Resolved Problems:    * No resolved hospital problems. *      Consults:  IP CONSULT TO CARDIOLOGY  IP CONSULT TO IV TEAM  IP CONSULT TO PULMONOLOGY  IP CONSULT TO PALLIATIVE CARE  IP CONSULT TO HOSPICE    Procedures: None    Hospital Course: Presented  to the ED with c/o generalized weakness  Bilateral lower extremity cellulitis: Pt presented to the ER with generalized weakness, altered mental status and fatigue. Family reporting pt had not been acting like himself. From Assisted Living.  Symptoms started day 40.3 39.5   MCV 85.7 86.7 87.6   MCH 23.4* 23.4* 23.3*   MCHC 27.3* 27.0* 26.6*   RDW 18.6* 19.1* 18.8*    255 254   MPV 8.9 8.8 9.0       No results for input(s): POCGLU in the last 72 hours. Imaging:  CT HEAD WO CONTRAST    Result Date: 7/10/2023  EXAMINATION: CT OF THE HEAD WITHOUT CONTRAST  7/10/2023 5:54 pm TECHNIQUE: CT of the head was performed without the administration of intravenous contrast. Automated exposure control, iterative reconstruction, and/or weight based adjustment of the mA/kV was utilized to reduce the radiation dose to as low as reasonably achievable. COMPARISON: None. HISTORY: ORDERING SYSTEM PROVIDED HISTORY: confusion TECHNOLOGIST PROVIDED HISTORY: Reason for exam:->confusion Has a \"code stroke\" or \"stroke alert\" been called? ->No Decision Support Exception - unselect if not a suspected or confirmed emergency medical condition->Emergency Medical Condition (MA) FINDINGS: BRAIN/VENTRICLES: There is no acute intracranial hemorrhage, mass effect or midline shift. No abnormal extra-axial fluid collection. The gray-white differentiation is maintained without evidence of an acute infarct. There is no evidence of hydrocephalus. ORBITS: The visualized portion of the orbits demonstrate no acute abnormality. SINUSES: The visualized paranasal sinuses and mastoid air cells demonstrate no acute abnormality. SOFT TISSUES/SKULL:  No acute abnormality of the visualized skull or soft tissues. No acute intracranial abnormality. CT ABDOMEN PELVIS W IV CONTRAST Additional Contrast? None    Result Date: 7/10/2023  EXAMINATION: CT OF THE ABDOMEN AND PELVIS WITH CONTRAST 7/10/2023 5:54 pm TECHNIQUE: CT of the abdomen and pelvis was performed with the administration of intravenous contrast. Multiplanar reformatted images are provided for review.  Automated exposure control, iterative reconstruction, and/or weight based adjustment of the mA/kV was utilized to reduce the radiation dose to as low as reasonably achievable. COMPARISON: None. HISTORY: ORDERING SYSTEM PROVIDED HISTORY: generalized abdominal pain with AMS TECHNOLOGIST PROVIDED HISTORY: Reason for exam:->generalized abdominal pain with AMS Additional Contrast?->None Decision Support Exception - unselect if not a suspected or confirmed emergency medical condition->Emergency Medical Condition (MA) FINDINGS: Lower Chest: Visualized lungs are normal.  Cardiomegaly. Organs: The liver, spleen, pancreas are normal.  Cholelithiasis. Coarse calcifications involving the right adrenal gland. Bilateral renal atrophy. GI/Bowel: Colonic fecal retention. Normal small bowel and appendix Pelvis: Unremarkable. Peritoneum/Retroperitoneum: No free fluid or free air. Bones/Soft Tissues: Degenerative changes lumbar spine and hip joints. Colonic fecal retention otherwise no definitive findings to explain the patient's symptoms. Cholelithiasis. XR CHEST PORTABLE    Result Date: 7/10/2023  EXAMINATION: ONE XRAY VIEW OF THE CHEST 7/10/2023 4:02 pm COMPARISON: None. HISTORY: ORDERING SYSTEM PROVIDED HISTORY: Altered Mental Status TECHNOLOGIST PROVIDED HISTORY: Reason for exam:->Altered Mental Status FINDINGS: Heart is enlarged. Pulmonary vessels are prominent. Mild blunting of the costophrenic angles. Mild hazy density in the lower lungs. Suspect early/mild CHF. Superimposed pneumonia cannot be excluded. Patient Instructions: Patient . In review of the EMR, evaluation, management, and diagnosis. Discharge plan has been discussed with attending.   Time spent 45 minutes    Signed:  Electronically signed by CHITO Hutchinson CNP on 2023 at 12:37 PM  HOSPITALIST ATTENDING PHYSICIAN NOTE 2023 1818PM:    Details of the evaluation - subjective assessment (including medication profile, past medical, family and social history when applicable), examination, review of lab and test data, diagnostic impressions and medical